# Patient Record
Sex: MALE | Race: WHITE | NOT HISPANIC OR LATINO | URBAN - METROPOLITAN AREA
[De-identification: names, ages, dates, MRNs, and addresses within clinical notes are randomized per-mention and may not be internally consistent; named-entity substitution may affect disease eponyms.]

---

## 2018-05-24 ENCOUNTER — EMERGENCY (EMERGENCY)
Facility: HOSPITAL | Age: 23
LOS: 1 days | Discharge: HOME | End: 2018-05-24
Attending: EMERGENCY MEDICINE | Admitting: EMERGENCY MEDICINE
Payer: COMMERCIAL

## 2018-05-24 VITALS
OXYGEN SATURATION: 98 % | SYSTOLIC BLOOD PRESSURE: 123 MMHG | RESPIRATION RATE: 18 BRPM | HEART RATE: 105 BPM | DIASTOLIC BLOOD PRESSURE: 71 MMHG

## 2018-05-24 PROCEDURE — 99282 EMERGENCY DEPT VISIT SF MDM: CPT

## 2018-05-24 RX ORDER — CHOLECALCIFEROL (VITAMIN D3) 125 MCG
2 CAPSULE ORAL
Qty: 28 | Refills: 0 | OUTPATIENT
Start: 2018-05-24 | End: 2018-06-06

## 2018-05-24 RX ORDER — OMEGA-3 ACID ETHYL ESTERS 1 G
2 CAPSULE ORAL
Qty: 56 | Refills: 0 | OUTPATIENT
Start: 2018-05-24 | End: 2018-06-06

## 2018-05-24 RX ORDER — CLINDAMYCIN PHOSPHATE GEL USP, 1% 10 MG/G
1 GEL TOPICAL
Qty: 1 | Refills: 0 | OUTPATIENT
Start: 2018-05-24 | End: 2018-06-06

## 2018-05-24 RX ORDER — DIVALPROEX SODIUM 500 MG/1
3 TABLET, DELAYED RELEASE ORAL
Qty: 84 | Refills: 0 | OUTPATIENT
Start: 2018-05-24 | End: 2018-06-06

## 2018-05-24 RX ORDER — METHYLPREDNISOLONE 4 MG
1 TABLET ORAL
Qty: 28 | Refills: 0 | OUTPATIENT
Start: 2018-05-24 | End: 2018-06-06

## 2018-05-24 RX ORDER — IBUPROFEN 200 MG
1 TABLET ORAL
Qty: 42 | Refills: 0 | OUTPATIENT
Start: 2018-05-24 | End: 2018-06-06

## 2018-05-24 RX ORDER — BACITRACIN ZINC 500 UNIT/G
1 OINTMENT IN PACKET (EA) TOPICAL
Qty: 1 | Refills: 0 | OUTPATIENT
Start: 2018-05-24 | End: 2018-06-06

## 2018-05-24 RX ORDER — ZINC OXIDE 200 MG/G
1 OINTMENT TOPICAL
Qty: 1 | Refills: 0 | OUTPATIENT
Start: 2018-05-24 | End: 2018-06-06

## 2018-05-24 RX ORDER — ACETAMINOPHEN 500 MG
2 TABLET ORAL
Qty: 168 | Refills: 0 | OUTPATIENT
Start: 2018-05-24 | End: 2018-06-06

## 2018-05-24 RX ORDER — LANOLIN/MINERAL OIL
1 LOTION (ML) TOPICAL
Qty: 1 | Refills: 0 | OUTPATIENT
Start: 2018-05-24 | End: 2018-06-06

## 2018-05-24 RX ORDER — POLYETHYLENE GLYCOL 3350 17 G/17G
17 POWDER, FOR SOLUTION ORAL
Qty: 1 | Refills: 0 | OUTPATIENT
Start: 2018-05-24 | End: 2018-06-06

## 2018-05-24 RX ORDER — QUETIAPINE FUMARATE 200 MG/1
1 TABLET, FILM COATED ORAL
Qty: 42 | Refills: 0 | OUTPATIENT
Start: 2018-05-24 | End: 2018-06-06

## 2018-05-24 RX ORDER — RISPERIDONE 4 MG/1
1 TABLET ORAL
Qty: 28 | Refills: 0 | OUTPATIENT
Start: 2018-05-24 | End: 2018-06-06

## 2018-05-24 NOTE — ED PROVIDER NOTE - SKIN WOUND DESCRIPTION
4.5cm laceration to the dorsum of right arm, healing, no signs of infection, 2 abrasions noted on forehead

## 2018-05-24 NOTE — ED ADULT TRIAGE NOTE - CHIEF COMPLAINT QUOTE
pt coming from group home with aides. aide reports pt is new to group home and needs medication refill.   hx-  cerbral palsy, MR

## 2018-08-19 ENCOUNTER — EMERGENCY (EMERGENCY)
Facility: HOSPITAL | Age: 23
LOS: 1 days | Discharge: HOME | End: 2018-08-19
Attending: EMERGENCY MEDICINE
Payer: COMMERCIAL

## 2018-08-19 VITALS
HEART RATE: 52 BPM | RESPIRATION RATE: 20 BRPM | DIASTOLIC BLOOD PRESSURE: 82 MMHG | SYSTOLIC BLOOD PRESSURE: 116 MMHG | OXYGEN SATURATION: 98 % | TEMPERATURE: 98 F

## 2018-08-19 PROCEDURE — 99283 EMERGENCY DEPT VISIT LOW MDM: CPT

## 2018-08-19 NOTE — ED ADULT NURSE NOTE - NSIMPLEMENTINTERV_GEN_ALL_ED
Implemented All Fall Risk Interventions:  [place] to call system. Call bell, personal items and telephone within reach. Instruct patient to call for assistance. Room bathroom lighting operational. Non-slip footwear when patient is off stretcher. Physically safe environment: no spills, clutter or unnecessary equipment. Stretcher in lowest position, wheels locked, appropriate side rails in place. Provide visual cue, wrist band, yellow gown, etc. Monitor gait and stability. Monitor for mental status changes and reorient to person, place, and time. Review medications for side effects contributing to fall risk. Reinforce activity limits and safety measures with patient and family.

## 2018-08-19 NOTE — ED PROVIDER NOTE - PROGRESS NOTE DETAILS
AG PGY2: reached out to [facility] times. Unable to get care provider on the line. LEft a message for callback. Per aid - patient is at his baseline neurologically and there were no other complaints from the group home. AG PGY2 - patient violently moving in bed - jumped out of bed and now laying on floor - helped /escort patient back to bed and ordered Constant obs. No new head injury.

## 2018-08-19 NOTE — ED PROVIDER NOTE - MEDICAL DECISION MAKING DETAILS
see attending note see attending note  23M hx cp/mr p/w head injury. No active bleed. Patient is at baseline per aid. Will clean wound, update tetatnus status. Patient is well appearing and VSS. Will likely d/c home with f/u.

## 2018-08-19 NOTE — ED PROVIDER NOTE - OBJECTIVE STATEMENT
23M hx cp, MR, from [facility] c/o head injury. Per aid - patient sustained head injury 1 week ago on forehead. Patient is known to bang his head against various things from time to time. Aid reports that his forehead injury started bleeding again which prompted an ED visit. 23M hx cp, MR, from [facility] c/o head injury. Per aid - patient sustained head injury 1 week ago on forehead. Patient is known to bang his head against various things from time to time. Aid reports that his forehead injury started bleeding again which prompted an ED visit.  Aid - wiliam kramer

## 2018-08-19 NOTE — ED ADULT NURSE NOTE - OBJECTIVE STATEMENT
pt has MRCP and lives in a group home.  he is nonverbal and wears a helmet due to head banging.  he banged his head last week causing a cut and today has re-opened that cut.  facility denies he had loc or any n/v.  pt is agitated and combative and arrived with a  from the facility.

## 2018-08-19 NOTE — ED PROVIDER NOTE - CARDIAC, MLM
Normal rate, regular rhythm.  Heart sounds S1, S2.  No murmurs, rubs or gallops. 2+ pulses in distal extremities b/l

## 2018-08-19 NOTE — ED PROVIDER NOTE - NS ED ROS FT
CONST: no fevers, no chills  EYES: no pain  ENT: no sore throat   CV: no chest pain  RESP: no shortness of breath  ABD: no abdominal pain   : no dysuria  MSK: no back pain  NEURO: no headache or additional neurologic complaints  HEME: no easy bleeding  SKIN:  no rash +small forehead wound

## 2018-08-19 NOTE — ED PROVIDER NOTE - ATTENDING CONTRIBUTION TO CARE
------------ATTENDING NOTE------------   pt w/ facility guardian/advocate sent to ED for concerns of rebleeding to forehead laceration, pt has MRDD and often bangs forehead on objects (and always wears a helmet), aide describes pt hitting head 1 wk ago and had laceration that was treated w/ antibiotic ointment and bandage, pt opened up scab and had slight bleeding, wound is healing w/o infectious signs, pt also chronically chews on gums and has superficial cracks w/o infectious changes, per aide vaccinations utd, no need for intervention now, no additional signs of injury, d/w facility and aide, nml VS at [place], in depth dw all about ddx, tx, [place], continued close outpt fu.  - Oswaldo Pettit MD   ------------------------------------------------

## 2018-08-19 NOTE — ED PROVIDER NOTE - CONSTITUTIONAL, MLM
normal... Patient is nonverbal, trying to escape from bed. wearing helmat, Otherwise Well appearing, well nourished, awake, alert, Not answering questions or following commands - baseline per aid

## 2018-08-19 NOTE — ED PROVIDER NOTE - SKIN, MLM
Skin normal color for race, warm, dry and intact. No evidence of rash. Small 4cm horizontal laceration on forhead - very superficial, no active bleeding with small amount of dried blood on nose and b/l cheeks. No other evidence of wounds on head/neck/mouth

## 2018-08-20 PROBLEM — F39 UNSPECIFIED MOOD [AFFECTIVE] DISORDER: Chronic | Status: ACTIVE | Noted: 2018-05-24

## 2018-08-20 PROBLEM — G80.9 CEREBRAL PALSY, UNSPECIFIED: Chronic | Status: ACTIVE | Noted: 2018-05-24

## 2018-08-20 PROBLEM — F79 UNSPECIFIED INTELLECTUAL DISABILITIES: Chronic | Status: ACTIVE | Noted: 2018-05-24

## 2018-10-08 ENCOUNTER — EMERGENCY (EMERGENCY)
Facility: HOSPITAL | Age: 23
LOS: 1 days | Discharge: HOME | End: 2018-10-08
Attending: EMERGENCY MEDICINE | Admitting: EMERGENCY MEDICINE
Payer: COMMERCIAL

## 2018-10-08 VITALS — RESPIRATION RATE: 18 BRPM | HEART RATE: 82 BPM | OXYGEN SATURATION: 98 %

## 2018-10-08 PROCEDURE — 99283 EMERGENCY DEPT VISIT LOW MDM: CPT

## 2018-10-08 RX ORDER — TETANUS TOXOID, REDUCED DIPHTHERIA TOXOID AND ACELLULAR PERTUSSIS VACCINE, ADSORBED 5; 2.5; 8; 8; 2.5 [IU]/.5ML; [IU]/.5ML; UG/.5ML; UG/.5ML; UG/.5ML
0.5 SUSPENSION INTRAMUSCULAR ONCE
Qty: 0 | Refills: 0 | Status: COMPLETED | OUTPATIENT
Start: 2018-10-08 | End: 2018-10-08

## 2018-10-08 NOTE — ED PROVIDER NOTE - MEDICAL DECISION MAKING DETAILS
23y m w known CP and MR, frequent head banging, p/w head injx. Small nonbleeding laceration on posterior scalp present. Pt extremely combative and uncooperative at baseline. Per aides he is at his normal mental status, no LOC or vomiting. In setting of poor cooperation and hx violence, and due to no red flags and no bleeding, will not pursue further w/u or attempt suturing/stapling, as this would agitate pt and likely require sedation, likely inflicing more harm than good on pt overall. Will admin tetanus shot and dc w/ instrx to put bacitracin on wound and clear well -[place]

## 2018-10-08 NOTE — ED ADULT NURSE NOTE - OBJECTIVE STATEMENT
(facilitator RN ) Pt evaluated by MD Rocha.  Abrasion noted to the back of the head with no bleeding.  No behavioral change noted as per the staff from group home. Pt is awake, not cooperative.  Pt unable to stay still, unable to measure bp reading.  MD made aware.

## 2018-10-08 NOTE — ED ADULT NURSE NOTE - NSIMPLEMENTINTERV_GEN_ALL_ED
Implemented All Universal Safety Interventions:  [place] to call system. Call bell, personal items and telephone within reach. Instruct patient to call for assistance. Room bathroom lighting operational. Non-slip footwear when patient is off stretcher. Physically safe environment: no spills, clutter or unnecessary equipment. Stretcher in lowest position, wheels locked, appropriate side rails in place.

## 2018-10-08 NOTE — ED ADULT TRIAGE NOTE - CHIEF COMPLAINT QUOTE
p/t with severe MR sent from [facility] javon robbins, p/t was banging his head against the wall this am ,p/t wearing a  helmet @ all times no neuro deficits noted, p/t is awake and playful, unable to get a b/p reading p/t unable to stay still

## 2018-10-08 NOTE — ED PROVIDER NOTE - OBJECTIVE STATEMENT
23y m w/ PMHx CP, severe MR, resident at [facility] and hx repetitive head banging (wear's a soft helmet at all times), p/w head injury. Two aides from [facility] in room w/ patient; they state some blood was noted dripping down around edge of helmet this AM, and he was brought here for evaluation. It appears 23y m w/ PMHx CP, severe MR, resident at [facility] and hx repetitive head banging (wear's a soft helmet at all times), p/w head injury. Two aides from [facility] in room w/ patient; they state some blood was noted dripping down around edge of helmet this AM, and he was brought here for evaluation. It appears he injured himself through the soft helmet; the pt has a small laceration on the back of his scalp, not currently actively bleeding but with dried blood around the injury site. He is extremely combative on exam, per the aides this is his baseline. No witnessed nausea or vomiting, no LOC.

## 2018-10-08 NOTE — ED ADULT NURSE NOTE - CHIEF COMPLAINT QUOTE
p/t with severe MR sent from [facility] javon robbins, p/t was banging his head against the wall this am ,p/t wearing a  helmet @ all times no neuro deficits noted, p/t is awake and playful, unable to get a b/p reading p/t unable to stay still

## 2018-10-08 NOTE — ED PROVIDER NOTE - ATTENDING CONTRIBUTION TO CARE
Seen and examined, pt. uncooperative with exam and unable to give hx, per staff, witnessed head banging and then saw blood below helmet. Pt. has long hx of similar behavior and brought today to get checked. No change in behavior per staff who know pt. well, no N/V, ambulating normally. Examined in ED with full ROM neck, NT spine, clear lungs, heart reg, abd soft, flat, NT to palp, no bony ext. tenderness. Pt. at baseline has episodes of agitation and will not obey commands in ED but accepts exam.

## 2018-10-10 ENCOUNTER — INPATIENT (INPATIENT)
Facility: HOSPITAL | Age: 23
LOS: 5 days | Discharge: NURSING, REHAB OR LONG-TERM CARE | End: 2018-10-16
Attending: HOSPITALIST | Admitting: HOSPITALIST
Payer: COMMERCIAL

## 2018-10-10 VITALS
DIASTOLIC BLOOD PRESSURE: 98 MMHG | TEMPERATURE: 98 F | WEIGHT: 109.57 LBS | RESPIRATION RATE: 22 BRPM | SYSTOLIC BLOOD PRESSURE: 140 MMHG

## 2018-10-10 DIAGNOSIS — F39 UNSPECIFIED MOOD [AFFECTIVE] DISORDER: ICD-10-CM

## 2018-10-10 DIAGNOSIS — T18.9XXA FOREIGN BODY OF ALIMENTARY TRACT, PART UNSPECIFIED, INITIAL ENCOUNTER: ICD-10-CM

## 2018-10-10 LAB
ALBUMIN SERPL ELPH-MCNC: 4.2 G/DL — SIGNIFICANT CHANGE UP (ref 3.3–5)
ALP SERPL-CCNC: 65 U/L — SIGNIFICANT CHANGE UP (ref 40–120)
ALT FLD-CCNC: 18 U/L — SIGNIFICANT CHANGE UP (ref 4–41)
APTT BLD: 33.9 SEC — SIGNIFICANT CHANGE UP (ref 27.5–37.4)
AST SERPL-CCNC: 30 U/L — SIGNIFICANT CHANGE UP (ref 4–40)
BASOPHILS # BLD AUTO: 0.01 K/UL — SIGNIFICANT CHANGE UP (ref 0–0.2)
BASOPHILS NFR BLD AUTO: 0.1 % — SIGNIFICANT CHANGE UP (ref 0–2)
BILIRUB SERPL-MCNC: 0.3 MG/DL — SIGNIFICANT CHANGE UP (ref 0.2–1.2)
BLD GP AB SCN SERPL QL: NEGATIVE — SIGNIFICANT CHANGE UP
BUN SERPL-MCNC: 6 MG/DL — LOW (ref 7–23)
CALCIUM SERPL-MCNC: 9.6 MG/DL — SIGNIFICANT CHANGE UP (ref 8.4–10.5)
CHLORIDE SERPL-SCNC: 97 MMOL/L — LOW (ref 98–107)
CO2 SERPL-SCNC: 27 MMOL/L — SIGNIFICANT CHANGE UP (ref 22–31)
CREAT SERPL-MCNC: 0.66 MG/DL — SIGNIFICANT CHANGE UP (ref 0.5–1.3)
EOSINOPHIL # BLD AUTO: 0.07 K/UL — SIGNIFICANT CHANGE UP (ref 0–0.5)
EOSINOPHIL NFR BLD AUTO: 1 % — SIGNIFICANT CHANGE UP (ref 0–6)
GLUCOSE SERPL-MCNC: 89 MG/DL — SIGNIFICANT CHANGE UP (ref 70–99)
HCT VFR BLD CALC: 41.4 % — SIGNIFICANT CHANGE UP (ref 39–50)
HGB BLD-MCNC: 13.6 G/DL — SIGNIFICANT CHANGE UP (ref 13–17)
IMM GRANULOCYTES # BLD AUTO: 0.07 # — SIGNIFICANT CHANGE UP
IMM GRANULOCYTES NFR BLD AUTO: 1 % — SIGNIFICANT CHANGE UP (ref 0–1.5)
INR BLD: 1.04 — SIGNIFICANT CHANGE UP (ref 0.88–1.17)
LYMPHOCYTES # BLD AUTO: 0.9 K/UL — LOW (ref 1–3.3)
LYMPHOCYTES # BLD AUTO: 12.6 % — LOW (ref 13–44)
MCHC RBC-ENTMCNC: 30 PG — SIGNIFICANT CHANGE UP (ref 27–34)
MCHC RBC-ENTMCNC: 32.9 % — SIGNIFICANT CHANGE UP (ref 32–36)
MCV RBC AUTO: 91.2 FL — SIGNIFICANT CHANGE UP (ref 80–100)
MONOCYTES # BLD AUTO: 0.89 K/UL — SIGNIFICANT CHANGE UP (ref 0–0.9)
MONOCYTES NFR BLD AUTO: 12.5 % — SIGNIFICANT CHANGE UP (ref 2–14)
NEUTROPHILS # BLD AUTO: 5.18 K/UL — SIGNIFICANT CHANGE UP (ref 1.8–7.4)
NEUTROPHILS NFR BLD AUTO: 72.8 % — SIGNIFICANT CHANGE UP (ref 43–77)
NRBC # FLD: 0 — SIGNIFICANT CHANGE UP
PLATELET # BLD AUTO: 136 K/UL — LOW (ref 150–400)
PMV BLD: 10.9 FL — SIGNIFICANT CHANGE UP (ref 7–13)
POTASSIUM SERPL-MCNC: 4.3 MMOL/L — SIGNIFICANT CHANGE UP (ref 3.5–5.3)
POTASSIUM SERPL-SCNC: 4.3 MMOL/L — SIGNIFICANT CHANGE UP (ref 3.5–5.3)
PROT SERPL-MCNC: 7.7 G/DL — SIGNIFICANT CHANGE UP (ref 6–8.3)
PROTHROM AB SERPL-ACNC: 11.6 SEC — SIGNIFICANT CHANGE UP (ref 9.8–13.1)
RBC # BLD: 4.54 M/UL — SIGNIFICANT CHANGE UP (ref 4.2–5.8)
RBC # FLD: 13.5 % — SIGNIFICANT CHANGE UP (ref 10.3–14.5)
RH IG SCN BLD-IMP: POSITIVE — SIGNIFICANT CHANGE UP
SODIUM SERPL-SCNC: 139 MMOL/L — SIGNIFICANT CHANGE UP (ref 135–145)
WBC # BLD: 7.12 K/UL — SIGNIFICANT CHANGE UP (ref 3.8–10.5)
WBC # FLD AUTO: 7.12 K/UL — SIGNIFICANT CHANGE UP (ref 3.8–10.5)

## 2018-10-10 PROCEDURE — 99223 1ST HOSP IP/OBS HIGH 75: CPT

## 2018-10-10 PROCEDURE — 99222 1ST HOSP IP/OBS MODERATE 55: CPT | Mod: GC

## 2018-10-10 PROCEDURE — 70490 CT SOFT TISSUE NECK W/O DYE: CPT | Mod: 26

## 2018-10-10 PROCEDURE — 74176 CT ABD & PELVIS W/O CONTRAST: CPT | Mod: 26

## 2018-10-10 RX ORDER — CHOLECALCIFEROL (VITAMIN D3) 125 MCG
2000 CAPSULE ORAL DAILY
Qty: 0 | Refills: 0 | Status: DISCONTINUED | OUTPATIENT
Start: 2018-10-10 | End: 2018-10-16

## 2018-10-10 RX ORDER — RISPERIDONE 4 MG/1
1 TABLET ORAL
Qty: 0 | Refills: 0 | COMMUNITY

## 2018-10-10 RX ORDER — LITHIUM CARBONATE 300 MG/1
450 TABLET, EXTENDED RELEASE ORAL
Qty: 0 | Refills: 0 | COMMUNITY

## 2018-10-10 RX ORDER — DIPHENHYDRAMINE HCL 50 MG
50 CAPSULE ORAL ONCE
Qty: 0 | Refills: 0 | Status: COMPLETED | OUTPATIENT
Start: 2018-10-10 | End: 2018-10-10

## 2018-10-10 RX ORDER — QUETIAPINE FUMARATE 200 MG/1
400 TABLET, FILM COATED ORAL
Qty: 0 | Refills: 0 | Status: DISCONTINUED | OUTPATIENT
Start: 2018-10-10 | End: 2018-10-16

## 2018-10-10 RX ORDER — LITHIUM CARBONATE 300 MG/1
450 TABLET, EXTENDED RELEASE ORAL AT BEDTIME
Qty: 0 | Refills: 0 | Status: DISCONTINUED | OUTPATIENT
Start: 2018-10-10 | End: 2018-10-10

## 2018-10-10 RX ORDER — HALOPERIDOL DECANOATE 100 MG/ML
5 INJECTION INTRAMUSCULAR ONCE
Qty: 0 | Refills: 0 | Status: COMPLETED | OUTPATIENT
Start: 2018-10-10 | End: 2018-10-10

## 2018-10-10 RX ORDER — DIVALPROEX SODIUM 500 MG/1
1 TABLET, DELAYED RELEASE ORAL
Qty: 0 | Refills: 0 | COMMUNITY

## 2018-10-10 RX ORDER — SODIUM CHLORIDE 9 MG/ML
1000 INJECTION INTRAMUSCULAR; INTRAVENOUS; SUBCUTANEOUS ONCE
Qty: 0 | Refills: 0 | Status: COMPLETED | OUTPATIENT
Start: 2018-10-10 | End: 2018-10-10

## 2018-10-10 RX ORDER — RISPERIDONE 4 MG/1
3 TABLET ORAL AT BEDTIME
Qty: 0 | Refills: 0 | Status: DISCONTINUED | OUTPATIENT
Start: 2018-10-10 | End: 2018-10-16

## 2018-10-10 RX ORDER — INFLUENZA VIRUS VACCINE 15; 15; 15; 15 UG/.5ML; UG/.5ML; UG/.5ML; UG/.5ML
0.5 SUSPENSION INTRAMUSCULAR ONCE
Qty: 0 | Refills: 0 | Status: DISCONTINUED | OUTPATIENT
Start: 2018-10-10 | End: 2018-10-16

## 2018-10-10 RX ORDER — LITHIUM CARBONATE 300 MG/1
450 TABLET, EXTENDED RELEASE ORAL AT BEDTIME
Qty: 0 | Refills: 0 | Status: DISCONTINUED | OUTPATIENT
Start: 2018-10-10 | End: 2018-10-16

## 2018-10-10 RX ORDER — QUETIAPINE FUMARATE 200 MG/1
1 TABLET, FILM COATED ORAL
Qty: 0 | Refills: 0 | COMMUNITY

## 2018-10-10 RX ADMIN — HALOPERIDOL DECANOATE 5 MILLIGRAM(S): 100 INJECTION INTRAMUSCULAR at 15:49

## 2018-10-10 RX ADMIN — SODIUM CHLORIDE 1000 MILLILITER(S): 9 INJECTION INTRAMUSCULAR; INTRAVENOUS; SUBCUTANEOUS at 14:30

## 2018-10-10 RX ADMIN — Medication 2 MILLIGRAM(S): at 15:49

## 2018-10-10 RX ADMIN — LITHIUM CARBONATE 450 MILLIGRAM(S): 300 TABLET, EXTENDED RELEASE ORAL at 21:57

## 2018-10-10 RX ADMIN — Medication 100 MILLIGRAM(S): at 22:59

## 2018-10-10 RX ADMIN — Medication 50 MILLIGRAM(S): at 12:40

## 2018-10-10 RX ADMIN — RISPERIDONE 3 MILLIGRAM(S): 4 TABLET ORAL at 21:58

## 2018-10-10 RX ADMIN — QUETIAPINE FUMARATE 400 MILLIGRAM(S): 200 TABLET, FILM COATED ORAL at 21:58

## 2018-10-10 RX ADMIN — HALOPERIDOL DECANOATE 5 MILLIGRAM(S): 100 INJECTION INTRAMUSCULAR at 13:40

## 2018-10-10 RX ADMIN — Medication 2 MILLIGRAM(S): at 11:26

## 2018-10-10 NOTE — H&P ADULT - PROBLEM SELECTOR PLAN 1
case d/w GI.  Discussed CT findings.  As fragments appear to be past pylorus, unlikely that endoscopy can retrieve them.    - will re-evaluate in am  - diet resumed, and NPO after midnight

## 2018-10-10 NOTE — CONSULT NOTE ADULT - ASSESSMENT
Impression:  # Glass ingestion: No evidence of peritonitis on exam. Unclear if patient swallowed a significant amount of glass given aides were able to remove it. He also ate this morning which would severely limit endoscopic visualization at this time.     Recommendation:  - Obtain CT A/P to assess for foreign bodies  - Will discuss possible endoscopic intervention pending CT results  - Supportive care per primary team    Nila Schwartz MD  Gastroenterology Fellow  [phone]  Please page on call fellow on weekends and after 5pm on weekdays

## 2018-10-10 NOTE — H&P ADULT - NSHPLABSRESULTS_GEN_ALL_CORE
10-10    139  |  97<L>  |  6<L>  ----------------------------<  89  4.3   |  27  |  0.66    Ca    9.6      10 Oct 2018 10:53    TPro  7.7  /  Alb  4.2  /  TBili  0.3  /  DBili  x   /  AST  30  /  ALT  18  /  AlkPhos  65  10-10                            13.6   7.12  )-----------( 136      ( 10 Oct 2018 10:53 )             41.4             PT/INR - ( 10 Oct 2018 10:53 )   PT: 11.6 SEC;   INR: 1.04          PTT - ( 10 Oct 2018 10:53 )  PTT:33.9 SEC    < from: CT Abdomen and Pelvis No Cont (10.10.18 @ 16:45) >    High density linear objects in the proximal duodenum may represent   fragments of glass. Additional punctate densities in the jejunum,   possibly additional fragments.    < end of copied text >

## 2018-10-10 NOTE — ED PEDIATRIC TRIAGE NOTE - CHIEF COMPLAINT QUOTE
Patient took picture frame with glass and started biting/eating it, here to r/o glass in throat. Patient noted to have small laceration to right index finger. As per aide from group home, "swept glass out with a minimal amount of blood." Patient at baseline mental status, drooling normally no blood noted. Patient with clear LS bilaterally.

## 2018-10-10 NOTE — ED ADULT NURSE NOTE - OBJECTIVE STATEMENT
Pt rec'd to ED R#15 from [facility], accompanied by 2 mental health workers, after pt found this AM chewing on glass from broken picture frame this AM. Staff state he seems to be coughing more than at baseline. Pt is in NAD, respirations even and unlabored, no coughing noted upon arrival to ED. Aides deny any blood coming from mouth/ lips/ teeth. Deny any vomiting. Pt drooling as per baseline, pt is acting normally as per aids.

## 2018-10-10 NOTE — ED PROVIDER NOTE - ATTENDING CONTRIBUTION TO CARE
22 yo male MR, with aides stating he ate glass frame, had to take pieces of glass out of his mouth this morning, sent to ED to r/o swallowed glass. Patient comfortable. Able to clearly visualize oropharynx, which had no lacerations. discussed with GI, want to admit patient for endoscopy under anesthesia, CT of chest and abdomen.

## 2018-10-10 NOTE — ED PROVIDER NOTE - ENMT, MLM
Airway patent, Nasal mucosa clear. Mouth with normal mucosa. Throat has no vesicles, no oropharyngeal exudates and uvula is midline. Oropharynx with no lacerations or bleeding. Small abrasion noted to crease of lips on right.

## 2018-10-10 NOTE — H&P ADULT - HISTORY OF PRESENT ILLNESS
22 y/o M with cerebral palsy, severe MR (nonverbal,) and mood disorder with self harming behavior presents to the ED after swallowing glass.  [facility] from [facility] report that pt was eating this am when he took a glass display plaque from the wall, and bit off a piece.  Staff members were able to remove most of the glass fragments from his mouth, but they think he swallowed some.  Pt is not able to communicate pain or discomfort, but has been at baseline with typical intermittent agitation per  staff.     In the ED, pt was evaluated by gastroenterology.

## 2018-10-10 NOTE — ED ADULT TRIAGE NOTE - CHIEF COMPLAINT QUOTE
Patient was sent in by the [facility] for eating glass this morning,  Pt is non verbal hx of MR pt has two mental health workers at this side.  No respiratory distress noted no bleeding noted in the mouth.

## 2018-10-10 NOTE — ED PROVIDER NOTE - MEDICAL DECISION MAKING DETAILS
22 yo male with pmhx of MR, presents to ED for chewing on glass since morning x 7:30am. will check CT, ENT and GI consult

## 2018-10-10 NOTE — ED PROVIDER NOTE - PROGRESS NOTE DETAILS
KIRA Hair- pt noted to become agitated with nurse aids, given 2 of ativan. KIRA Hair- spoke with ENT PA, suggesting to CT neck. if ct negative, no necessary scope or intervention needed. spoke with GI, suggesting pre-ops labs, plan for endoscopy. will CT abdomen/pelvis as well given pt had PO food intake. KIRA Hair- pt required multiple rounds of medication for pt to be cooperative for CT. tolerated well. spoke with hospitalist, will admit to medicine, GI following, will f/u CT neck results. MAR paged

## 2018-10-10 NOTE — PATIENT PROFILE ADULT - NSASFALLNEEDSASSIST_GEN_A_NUR
[] : no respiratory distress [Apical Impulse] : the apical impulse was normal [Clean] : clean [Healing Well] : healing well [No Edema] : no edema yes

## 2018-10-10 NOTE — ED PROVIDER NOTE - OBJECTIVE STATEMENT
hx obtained from two nurse aids from group home:    22 yo male with pmhx of MR, presents to ED for chewing on glass since morning x 7:30am. Nurse aid witnessed pt with photo frame glass in his hands and chewing on it. Nurse aide removed small pieces from his mouth and heard him chewing on smaller pieces. Pt may have swallowed glass, aids are unsure. Pt with hx of bagging head on walls and wears soft helmet. Hx of agitation and physical violent behavior. Noted with small cut on right index finger. Tetanus updated 2 days ago in ED for hitting his head, laceration was not repaired. No fever, hemoptysis, vomiting. Ate food this morning.

## 2018-10-10 NOTE — ED ADULT NURSE NOTE - CHIEF COMPLAINT QUOTE
Patient was sent in by the [employer] for eating glass this morning,  Pt is non verbal hx of MR pt has two mental health workers at this side.  No respiratory distress noted no bleeding noted in the mouth.

## 2018-10-10 NOTE — CONSULT NOTE ADULT - SUBJECTIVE AND OBJECTIVE BOX
Chief Complaint:  Patient is a 23y old  Male who presents with a chief complaint of glass ingestion    HPI:  23 year old man with hx of Mental Retardation (nonverbal at baseline, resides in group home) presents after eating glass from a picture. Per aides at bedside, patient was in his usual state of health this morning when they saw him chewing on glass from a picture. They were able to remove the majority of the glass from his mouth, but afterwards continued to hear 'crunching.' Due to concerns that he may have swallowed the glass, they brought him the ED. Patient is unable to verbalize complaints, but he has been behaving normally since the incident. No recent reports of fevers, nausea, vomiting, diarrhea, melena, hematochezia and cough.  Aides report patient ate oatmeal this morning prior to chewing the glass    Allergies:  No Known Allergies    Home Medications:  None    Hospital Medications:    PMHX/PSHX:  Mood disorder  MR (mental retardation)  Cerebral palsy    Family history:    Unable to Assess     Social History:   Unable to Assess     ROS:   Unable to Assess     PHYSICAL EXAM:   GENERAL:  NAD, Appears stated age  HEENT:  NC/AT,  conjunctivae clear and pink, sclera -anicteric  CHEST:  CTA B/L, Normal effort  HEART:  RRR S1/S2, No murmurs  ABDOMEN:  Soft, non-tender, non-distended, normoactive bowel sounds  EXTEREMITIES:  No cyanosis or Edema  SKIN:  Warm & Dry. No rash or erythema  NEURO: AOx0, moving all extremities    Vital Signs:  Vital Signs Last 24 Hrs  T(C): 36.9 (10 Oct 2018 09:11), Max: 36.9 (10 Oct 2018 09:11)  T(F): 98.4 (10 Oct 2018 09:11), Max: 98.4 (10 Oct 2018 09:11)  HR: 110 (10 Oct 2018 11:33) (110 - 110)  BP: 140/89 (10 Oct 2018 11:33) (131/86 - 140/98)  BP(mean): --  RR: 16 (10 Oct 2018 11:33) (16 - 22)  SpO2: 100% (10 Oct 2018 11:33) (100% - 100%)  Daily     Daily     LABS:                        13.6   7.12  )-----------( 136      ( 10 Oct 2018 10:53 )             41.4     Mean Cell Volume: 91.2 fL (10-10-18 @ 10:53)

## 2018-10-10 NOTE — ED ADULT NURSE NOTE - NSIMPLEMENTINTERV_GEN_ALL_ED
Implemented All Fall Risk Interventions:  [place] to call system. Call bell, personal items and telephone within reach. Instruct patient to call for assistance. Room bathroom lighting operational. Non-slip footwear when patient is off stretcher. Physically safe environment: no spills, clutter or unnecessary equipment. Stretcher in lowest position, wheels locked, appropriate side rails in place. Provide visual cue, wrist band, yellow gown, etc. Monitor gait and stability. Monitor for mental status changes and reorient to person, place, and time. Review medications for side effects contributing to fall risk. Reinforce activity limits and safety measures with patient and family.

## 2018-10-10 NOTE — H&P ADULT - NSHPPHYSICALEXAM_GEN_ALL_CORE
Vital Signs Last 24 Hrs  T(C): 37 (10 Oct 2018 15:23), Max: 37.6 (10 Oct 2018 13:40)  T(F): 98.6 (10 Oct 2018 15:23), Max: 99.7 (10 Oct 2018 13:40)  HR: 98 (10 Oct 2018 15:23) (98 - 117)  BP: 141/92 (10 Oct 2018 15:23) (131/86 - 141/92)  BP(mean): --  RR: 16 (10 Oct 2018 15:23) (16 - 22)  SpO2: 100% (10 Oct 2018 15:23) (100% - 100%)    PHYSICAL EXAM:  GENERAL: No Acute Distress  EYES: conjunctiva and sclera clear  ENMT: Moist mucous membranes   NECK: Supple  CHEST/LUNG: Clear to auscultation bilaterally  HEART: Tachy, regular rhythm; No murmurs, rubs, or gallops  ABDOMEN: Soft, Nontender, Nondistended; Bowel sounds normal  EXTREMITIES:   No clubbing, cyanosis, or pedal edema  PSYCH: Agitated, kicking bed rails  NEUROLOGY:   - Mental status alert, nonverbal, noncommunicative   SKIN: No rashes or lesions  MUSCULOSKELETAL: No joint swelling

## 2018-10-11 DIAGNOSIS — F89 UNSPECIFIED DISORDER OF PSYCHOLOGICAL DEVELOPMENT: ICD-10-CM

## 2018-10-11 PROCEDURE — 99232 SBSQ HOSP IP/OBS MODERATE 35: CPT | Mod: GC

## 2018-10-11 PROCEDURE — 90792 PSYCH DIAG EVAL W/MED SRVCS: CPT

## 2018-10-11 PROCEDURE — 99233 SBSQ HOSP IP/OBS HIGH 50: CPT

## 2018-10-11 PROCEDURE — 93010 ELECTROCARDIOGRAM REPORT: CPT

## 2018-10-11 RX ADMIN — Medication 2 MILLIGRAM(S): at 17:58

## 2018-10-11 RX ADMIN — RISPERIDONE 3 MILLIGRAM(S): 4 TABLET ORAL at 21:34

## 2018-10-11 RX ADMIN — Medication 2000 UNIT(S): at 17:43

## 2018-10-11 RX ADMIN — QUETIAPINE FUMARATE 400 MILLIGRAM(S): 200 TABLET, FILM COATED ORAL at 17:43

## 2018-10-11 RX ADMIN — LITHIUM CARBONATE 450 MILLIGRAM(S): 300 TABLET, EXTENDED RELEASE ORAL at 21:34

## 2018-10-11 RX ADMIN — Medication 100 MILLIGRAM(S): at 21:34

## 2018-10-11 NOTE — BEHAVIORAL HEALTH ASSESSMENT NOTE - SUMMARY
22 y/o M with cerebral palsy, severe MR (nonverbal,) and mood disorder with self harming behavior presents to the ED after swallowing glass. Patient has had intermittent frequent episodes of aggressive and unpredictable behavior, often involving self-injury, such banging his head against the wall. He also has frequent bursts of agitation with violent behavior, including biting people. Patient was recently started on lithium which reportedly has been helpful for his aggression and impulsivity.     Recommendations:   - Continue lithium CR 450mg po qhs   - Continue risperidone 3mg po daily   - Continue Seroquel 400mg po BID   - Haldol 5mg po PRN q6h for agitation   - Haldol 5mg/Ativan 2mg IVP PRN q6h for severe agitation 22 y/o M with cerebral palsy, severe MR (nonverbal,) and mood disorder with self harming behavior presents to the ED after swallowing glass. Patient has had intermittent frequent episodes of aggressive and unpredictable behavior, often involving self-injury, such banging his head against the wall. He also has frequent bursts of agitation with violent behavior, including biting people. Patient was recently started on lithium which reportedly has been helpful for his aggression and impulsivity.     Recommendations:   - Continue lithium CR 450mg po qhs   - Continue risperidone 3mg po daily   - Continue Seroquel 400mg po BID   - Ativan 2mg IVP PRN q6h for severe agitation

## 2018-10-11 NOTE — CONSULT NOTE ADULT - ASSESSMENT
ASSESSMENT:  23M w/ CP, severe MR, mood disorder with baseline self-harming behavior admitted for swallowing glass. Surgery is consulted for management of glass fragment ingestion.     -     Patient discussed with Dr. Bowser PGY-2  Surgery pager [phone] ASSESSMENT:  23M w/ CP, severe MR, mood disorder with baseline self-harming behavior admitted for swallowing glass. Surgery is consulted for management of glass fragment ingestion. At this time, patient is stable without signs of     [facility]  Surgery pager [phone] ASSESSMENT:  23M w/ CP, severe MR, mood disorder with baseline self-harming behavior admitted for swallowing glass. Surgery is consulted for management of glass fragment ingestion. At this time, patient is stable without signs of perforation and is hemodynamically stable. The glass shards have continued to progress through the bowel and will require continued abdominal exams and monitoring. Patient does not require surgical intervention to remove the shards unless patient becomes acutely peritonitic, febrile, tachycardic, hypotensive, or shows other overt signs of bleeding/perforation.     Patient discussed with Dr. Selby [facility] PGY-2  Surgery pager [phone]

## 2018-10-11 NOTE — PROGRESS NOTE ADULT - PROBLEM SELECTOR PLAN 1
case d/w GI  on CT fragments appear to be past pylorus, so unable to retrieve, will as gen surg input, PO as tolerates for now  - monitor closely for abdominal pain, fever, inc WBC

## 2018-10-11 NOTE — BEHAVIORAL HEALTH ASSESSMENT NOTE - CASE SUMMARY
Pt is a 24 y/o M with cerebral palsy, severe MR (nonverbal,) and mood disorder with self harming behavior presents to the ED after swallowing glass. Patient has had intermittent frequent episodes of aggressive and unpredictable behavior, often involving self-injury, such banging his head against the wall. He also has frequent bursts of agitation with violent behavior, including biting people. Patient was recently started on lithium which reportedly has been helpful for his aggression and impulsivity. Pt did respond well to an Ativan prn and may continue Ativan as needed.

## 2018-10-11 NOTE — CONSULT NOTE ADULT - SUBJECTIVE AND OBJECTIVE BOX
Surger Consultation Note  =====================================================  HPI:   23M w/ CP, severe MR, mood disorder with baseline self-harming behavior admitted for swallowing glass. Patient noncommunicative and unable to provide history, but per HPI patient attempted to eat glass display case. On CT scan, patient noted to have possible glass fragments in the duodenum and jejunum. Per staff members at bedside, patient is at baseline agitation and they do not believe that he is showing signs that he is in pain. Unable to assess ROS. Surgery is consulted for management of glass fragment ingestion.     HPI:  24 y/o M with cerebral palsy, severe MR (nonverbal,) and mood disorder with self harming behavior presents to the ED after swallowing glass.  Staff from [facility] report that pt was eating this am when he took a glass display plaque from the wall, and bit off a piece.  Staff members were able to remove most of the glass fragments from his mouth, but they think he swallowed some.  Pt is not able to communicate pain or discomfort, but has been at baseline with typical intermittent agitation per  staff.     In the ED, pt was evaluated by gastroenterology. (10 Oct 2018 19:21)    Allergies:   PAST MEDICAL & SURGICAL HISTORY:  Mood disorder  MR (mental retardation)  Cerebral palsy  No significant past surgical history    FAMILY HISTORY:  Family history unknown    SOCIAL HISTORY:     ADVANCE DIRECTIVES: Presumed Full Code      REVIEW OF SYSTEMS:    General: Non-Contributory  Skin/Breast: Non-Contributory  Ophthalmologic: Non-Contributory  ENMT: Non-Contributory  Respiratory and Thorax: Non-Contributory  Cardiovascular: Non-Contributory  Gastrointestinal: Non-Contributory  Genitourinary: Non-Contributory  Musculoskeletal: Non-Contributory  Neurological: Non-Contributory  Psychiatric: Non-Contributory  Hematology/Lymphatics: Non-Contributory  Endocrine: Non-Contributory  Allergic/Immunologic: Non-Contributory    HOME MEDICATIONS:    Home Medications:  Depakote  mg oral tablet, extended release: 1 tab(s) orally 3 times a day (10 Oct 2018 18:57)  lithium carbonate extended release: 450 milligram(s) orally once a day (at bedtime) (10 Oct 2018 18:57)  QUEtiapine 400 mg oral tablet: 1 tab(s) orally 2 times a day (10 Oct 2018 18:57)  risperiDONE 3 mg oral tablet: 1 tab(s) orally once a day (at bedtime) (10 Oct 2018 18:57)    CURRENT MEDICATIONS:   --------------------------------------------------------------------------------------  Neurologic Medications  lithium CR (ESKALITH-CR) 450 milliGRAM(s) Oral at bedtime  LORazepam     Tablet 2 milliGRAM(s) Oral every 6 hours PRN Agitation  LORazepam   Injectable 2 milliGRAM(s) IV Push every 6 hours PRN Severe Agitation  QUEtiapine 400 milliGRAM(s) Oral two times a day  risperiDONE   Tablet 3 milliGRAM(s) Oral at bedtime    Respiratory Medications    Cardiovascular Medications    Gastrointestinal Medications  cholecalciferol 2000 Unit(s) Oral daily    Genitourinary Medications    Hematologic/Oncologic Medications  influenza   Vaccine 0.5 milliLiter(s) IntraMuscular once    Antimicrobial/Immunologic Medications    Endocrine/Metabolic Medications    Topical/Other Medications    --------------------------------------------------------------------------------------    VITAL SIGNS, INS/OUTS (last 24 hours):  --------------------------------------------------------------------------------------  Vital Signs Last 24 Hrs  T(C): 36.2 (11 Oct 2018 21:17), Max: 36.4 (11 Oct 2018 05:41)  T(F): 97.2 (11 Oct 2018 21:17), Max: 97.6 (11 Oct 2018 13:48)  HR: 100 (11 Oct 2018 21:17) (89 - 109)  BP: 120/60 (11 Oct 2018 21:17) (110/72 - 127/84)  BP(mean): --  RR: 18 (11 Oct 2018 21:17) (18 - 18)  SpO2: 99% (11 Oct 2018 21:17) (98% - 99%)  --------------------------------------------------------------------------------------    EXAM  Vital Signs Last 24 Hrs  T(C): 36.2 (11 Oct 2018 21:17), Max: 36.4 (11 Oct 2018 05:41)  T(F): 97.2 (11 Oct 2018 21:17), Max: 97.6 (11 Oct 2018 13:48)  HR: 100 (11 Oct 2018 21:17) (89 - 109)  BP: 120/60 (11 Oct 2018 21:17) (110/72 - 127/84)  BP(mean): --  RR: 18 (11 Oct 2018 21:17) (18 - 18)  SpO2: 99% (11 Oct 2018 21:17) (98% - 99%)    LABS  --------------------------------------------------------------------------------------  Vital Signs Last 24 Hrs  T(C): 36.2 (11 Oct 2018 21:17), Max: 36.4 (11 Oct 2018 05:41)  T(F): 97.2 (11 Oct 2018 21:17), Max: 97.6 (11 Oct 2018 13:48)  HR: 100 (11 Oct 2018 21:17) (89 - 109)  BP: 120/60 (11 Oct 2018 21:17) (110/72 - 127/84)  BP(mean): --  RR: 18 (11 Oct 2018 21:17) (18 - 18)  SpO2: 99% (11 Oct 2018 21:17) (98% - 99%)  --------------------------------------------------------------------------------------    OTHER LABS    IMAGING RESULTS  CT:    LOWER CHEST: Within normal limits.    LIVER: Within normal limits.  BILE DUCTS: Normal caliber.  GALLBLADDER: Within normal limits.  SPLEEN: Within normal limits.  PANCREAS: Within normal limits.  ADRENALS: Within normal limits.  KIDNEYS/URETERS: Nonobstructing renal calculi in the right upper and   lower pole measuring up to 0.6 cm. No hydronephrosis.    BLADDER: Within normal limits.  REPRODUCTIVE ORGANS: The prostate is within normal limits.    BOWEL: Two high density linear objects measuring up to 0.5 cm in the   proximal/third portion of the duodenum (series 2 image 53 and 51).   Additional punctate high density material noted in the jejunum (series 2   image 57 and 62). No bowel obstruction.   PERITONEUM: No ascites or free air.  VESSELS:  Within normal limits.  RETROPERITONEUM: No lymphadenopathy.    ABDOMINAL WALL: Within normal limits.  BONES: Mild scoliotic curvature.    IMPRESSION:   High density linear objects in the proximal duodenum may represent   fragments of glass. Additional punctate densities in the jejunum,   possibly additional fragments.    ZORAIDA MARSHALL M.D., RADIOLOGY FELLOW  This document has been electronically signed.  CHARLES PETERSON M.D., ATTENDING RADIOLOGIST Surger Consultation Note  =====================================================  HPI:   23M w/ CP, severe MR, mood disorder with baseline self-harming behavior admitted for swallowing glass. Patient noncommunicative and unable to provide history, but per HPI patient attempted to eat glass display case. On CT scan, patient noted to have possible glass fragments in the duodenum and jejunum. Per staff members at bedside, patient is at baseline agitation and they do not believe that he is showing signs that he is in pain. Unable to assess ROS. Surgery is consulted for management of glass fragment ingestion.     HPI:  24 y/o M with cerebral palsy, severe MR (nonverbal,) and mood disorder with self harming behavior presents to the ED after swallowing glass.  Staff from [facility] report that pt was eating this am when he took a glass display plaque from the wall, and bit off a piece.  Staff members were able to remove most of the glass fragments from his mouth, but they think he swallowed some.  Pt is not able to communicate pain or discomfort, but has been at baseline with typical intermittent agitation per  staff.     In the ED, pt was evaluated by gastroenterology. (10 Oct 2018 19:21)    Allergies:   PAST MEDICAL & SURGICAL HISTORY:  Mood disorder  MR (mental retardation)  Cerebral palsy  No significant past surgical history    FAMILY HISTORY:  Family history unknown    SOCIAL HISTORY:     ADVANCE DIRECTIVES: Presumed Full Code      REVIEW OF SYSTEMS:    General: Non-Contributory  Skin/Breast: Non-Contributory  Ophthalmologic: Non-Contributory  ENMT: Non-Contributory  Respiratory and Thorax: Non-Contributory  Cardiovascular: Non-Contributory  Gastrointestinal: Non-Contributory  Genitourinary: Non-Contributory  Musculoskeletal: Non-Contributory  Neurological: Non-Contributory  Psychiatric: Non-Contributory  Hematology/Lymphatics: Non-Contributory  Endocrine: Non-Contributory  Allergic/Immunologic: Non-Contributory    HOME MEDICATIONS:    Home Medications:  Depakote  mg oral tablet, extended release: 1 tab(s) orally 3 times a day (10 Oct 2018 18:57)  lithium carbonate extended release: 450 milligram(s) orally once a day (at bedtime) (10 Oct 2018 18:57)  QUEtiapine 400 mg oral tablet: 1 tab(s) orally 2 times a day (10 Oct 2018 18:57)  risperiDONE 3 mg oral tablet: 1 tab(s) orally once a day (at bedtime) (10 Oct 2018 18:57)    CURRENT MEDICATIONS:   --------------------------------------------------------------------------------------  Neurologic Medications  lithium CR (ESKALITH-CR) 450 milliGRAM(s) Oral at bedtime  LORazepam     Tablet 2 milliGRAM(s) Oral every 6 hours PRN Agitation  LORazepam   Injectable 2 milliGRAM(s) IV Push every 6 hours PRN Severe Agitation  QUEtiapine 400 milliGRAM(s) Oral two times a day  risperiDONE   Tablet 3 milliGRAM(s) Oral at bedtime    Respiratory Medications    Cardiovascular Medications    Gastrointestinal Medications  cholecalciferol 2000 Unit(s) Oral daily    Genitourinary Medications    Hematologic/Oncologic Medications  influenza   Vaccine 0.5 milliLiter(s) IntraMuscular once    Antimicrobial/Immunologic Medications    Endocrine/Metabolic Medications    Topical/Other Medications    --------------------------------------------------------------------------------------    VITAL SIGNS, INS/OUTS (last 24 hours):  --------------------------------------------------------------------------------------  Vital Signs Last 24 Hrs  T(C): 36.2 (11 Oct 2018 21:17), Max: 36.4 (11 Oct 2018 05:41)  T(F): 97.2 (11 Oct 2018 21:17), Max: 97.6 (11 Oct 2018 13:48)  HR: 100 (11 Oct 2018 21:17) (89 - 109)  BP: 120/60 (11 Oct 2018 21:17) (110/72 - 127/84)  BP(mean): --  RR: 18 (11 Oct 2018 21:17) (18 - 18)  SpO2: 99% (11 Oct 2018 21:17) (98% - 99%)  --------------------------------------------------------------------------------------    EXAM  General: NAD, sleeping  Abdomen: soft nt nt     LABS  --------------------------------------------------------------------------------------  Vital Signs Last 24 Hrs  T(C): 36.2 (11 Oct 2018 21:17), Max: 36.4 (11 Oct 2018 05:41)  T(F): 97.2 (11 Oct 2018 21:17), Max: 97.6 (11 Oct 2018 13:48)  HR: 100 (11 Oct 2018 21:17) (89 - 109)  BP: 120/60 (11 Oct 2018 21:17) (110/72 - 127/84)  BP(mean): --  RR: 18 (11 Oct 2018 21:17) (18 - 18)  SpO2: 99% (11 Oct 2018 21:17) (98% - 99%)  --------------------------------------------------------------------------------------    OTHER LABS    IMAGING RESULTS  CT:    LOWER CHEST: Within normal limits.    LIVER: Within normal limits.  BILE DUCTS: Normal caliber.  GALLBLADDER: Within normal limits.  SPLEEN: Within normal limits.  PANCREAS: Within normal limits.  ADRENALS: Within normal limits.  KIDNEYS/URETERS: Nonobstructing renal calculi in the right upper and   lower pole measuring up to 0.6 cm. No hydronephrosis.    BLADDER: Within normal limits.  REPRODUCTIVE ORGANS: The prostate is within normal limits.    BOWEL: Two high density linear objects measuring up to 0.5 cm in the   proximal/third portion of the duodenum (series 2 image 53 and 51).   Additional punctate high density material noted in the jejunum (series 2   image 57 and 62). No bowel obstruction.   PERITONEUM: No ascites or free air.  VESSELS:  Within normal limits.  RETROPERITONEUM: No lymphadenopathy.    ABDOMINAL WALL: Within normal limits.  BONES: Mild scoliotic curvature.    IMPRESSION:   High density linear objects in the proximal duodenum may represent   fragments of glass. Additional punctate densities in the jejunum,   possibly additional fragments.    ZORAIDA MARSHALL M.D., RADIOLOGY FELLOW  This document has been electronically signed.  CHARLES PETERSON M.D., ATTENDING RADIOLOGIST

## 2018-10-11 NOTE — BEHAVIORAL HEALTH ASSESSMENT NOTE - NSBHCHARTREVIEWLAB_PSY_A_CORE FT
13.6   7.12  )-----------( 136      ( 10 Oct 2018 10:53 )             41.4   10-10    139  |  97<L>  |  6<L>  ----------------------------<  89  4.3   |  27  |  0.66    Ca    9.6      10 Oct 2018 10:53    TPro  7.7  /  Alb  4.2  /  TBili  0.3  /  DBili  x   /  AST  30  /  ALT  18  /  AlkPhos  65  10-10

## 2018-10-11 NOTE — BEHAVIORAL HEALTH ASSESSMENT NOTE - NSBHCHARTREVIEWVS_PSY_A_CORE FT
Vital Signs Last 24 Hrs  T(C): 36.4 (11 Oct 2018 05:41), Max: 37.6 (10 Oct 2018 13:40)  T(F): 97.5 (11 Oct 2018 05:41), Max: 99.7 (10 Oct 2018 13:40)  HR: 89 (11 Oct 2018 05:41) (89 - 117)  BP: 110/72 (11 Oct 2018 05:41) (110/72 - 141/92)  BP(mean): --  RR: 18 (11 Oct 2018 05:41) (16 - 18)  SpO2: 99% (11 Oct 2018 05:41) (98% - 100%)

## 2018-10-11 NOTE — BEHAVIORAL HEALTH ASSESSMENT NOTE - HPI (INCLUDE ILLNESS QUALITY, SEVERITY, DURATION, TIMING, CONTEXT, MODIFYING FACTORS, ASSOCIATED SIGNS AND SYMPTOMS)
ID:  24 y/o M with cerebral palsy, severe MR (nonverbal,) and mood disorder with self harming behavior presents to the ED after swallowing glass.      HPI:  Group home staff (Eleni Tejeda [phone]) who has worked with patient over the past 6 months since he lived in the group home, reports frequent episodes of aggressive and unpredictable behavior, often involving self-injury. Prior to admission, patient was observed breaking glass from display plaque from the wall, and bit off pieces of glass. Staff members intervened and were able to remove most of the glass fragments from his mouth. Patient also frequently hits his head with his hand or bangs his head against the wall, for which he is wearing a helmet. Patient is non-verbal and has minimal non-verbal communication, such as rubbing his chest. He is unable to communicate pain or discomfort, and shows sensitivity to noise. He has frequent bursts of agitation with violent behavior, including biting people. Patient was recently started on lithium which reportedly has been helpful for his aggression and impulsivity. On the unit, patient is accompanied by his health aide, ambulating in wheelchair, not making eye contact. His sleep has been normal.

## 2018-10-11 NOTE — BEHAVIORAL HEALTH ASSESSMENT NOTE - NSBHCONSULTMEDS_PSY_A_CORE FT
Recommendations:   - Continue lithium CR 450mg po qhs   - Continue risperidone 3mg po daily   - Continue Seroquel 400mg po BID   - Ativan 2mg IVP PRN q6h for severe agitation

## 2018-10-11 NOTE — BEHAVIORAL HEALTH ASSESSMENT NOTE - RISK ASSESSMENT
Patient is at chronically elevated risk for self-harm and harming others, and will require close observation. During episodes of agitation/aggression patient may require manual restraint for PRN med administration.

## 2018-10-12 DIAGNOSIS — F79 UNSPECIFIED INTELLECTUAL DISABILITIES: ICD-10-CM

## 2018-10-12 LAB
BUN SERPL-MCNC: 10 MG/DL — SIGNIFICANT CHANGE UP (ref 7–23)
CALCIUM SERPL-MCNC: 9 MG/DL — SIGNIFICANT CHANGE UP (ref 8.4–10.5)
CHLORIDE SERPL-SCNC: 100 MMOL/L — SIGNIFICANT CHANGE UP (ref 98–107)
CO2 SERPL-SCNC: 26 MMOL/L — SIGNIFICANT CHANGE UP (ref 22–31)
CREAT SERPL-MCNC: 0.67 MG/DL — SIGNIFICANT CHANGE UP (ref 0.5–1.3)
GLUCOSE SERPL-MCNC: 84 MG/DL — SIGNIFICANT CHANGE UP (ref 70–99)
HCT VFR BLD CALC: 42 % — SIGNIFICANT CHANGE UP (ref 39–50)
HGB BLD-MCNC: 14 G/DL — SIGNIFICANT CHANGE UP (ref 13–17)
MCHC RBC-ENTMCNC: 30.8 PG — SIGNIFICANT CHANGE UP (ref 27–34)
MCHC RBC-ENTMCNC: 33.3 % — SIGNIFICANT CHANGE UP (ref 32–36)
MCV RBC AUTO: 92.5 FL — SIGNIFICANT CHANGE UP (ref 80–100)
NRBC # FLD: 0 — SIGNIFICANT CHANGE UP
PLATELET # BLD AUTO: 153 K/UL — SIGNIFICANT CHANGE UP (ref 150–400)
PMV BLD: 10.5 FL — SIGNIFICANT CHANGE UP (ref 7–13)
POTASSIUM SERPL-MCNC: 4.2 MMOL/L — SIGNIFICANT CHANGE UP (ref 3.5–5.3)
POTASSIUM SERPL-SCNC: 4.2 MMOL/L — SIGNIFICANT CHANGE UP (ref 3.5–5.3)
RBC # BLD: 4.54 M/UL — SIGNIFICANT CHANGE UP (ref 4.2–5.8)
RBC # FLD: 14.1 % — SIGNIFICANT CHANGE UP (ref 10.3–14.5)
SODIUM SERPL-SCNC: 139 MMOL/L — SIGNIFICANT CHANGE UP (ref 135–145)
WBC # BLD: 11.07 K/UL — HIGH (ref 3.8–10.5)
WBC # FLD AUTO: 11.07 K/UL — HIGH (ref 3.8–10.5)

## 2018-10-12 PROCEDURE — 99233 SBSQ HOSP IP/OBS HIGH 50: CPT

## 2018-10-12 PROCEDURE — 74018 RADEX ABDOMEN 1 VIEW: CPT | Mod: 26

## 2018-10-12 RX ORDER — DOCUSATE SODIUM 100 MG
100 CAPSULE ORAL THREE TIMES A DAY
Qty: 0 | Refills: 0 | Status: DISCONTINUED | OUTPATIENT
Start: 2018-10-12 | End: 2018-10-16

## 2018-10-12 RX ORDER — SENNA PLUS 8.6 MG/1
2 TABLET ORAL AT BEDTIME
Qty: 0 | Refills: 0 | Status: DISCONTINUED | OUTPATIENT
Start: 2018-10-12 | End: 2018-10-16

## 2018-10-12 RX ORDER — POLYETHYLENE GLYCOL 3350 17 G/17G
17 POWDER, FOR SOLUTION ORAL
Qty: 0 | Refills: 0 | Status: DISCONTINUED | OUTPATIENT
Start: 2018-10-12 | End: 2018-10-16

## 2018-10-12 RX ORDER — LACTULOSE 10 G/15ML
20 SOLUTION ORAL EVERY 6 HOURS
Qty: 0 | Refills: 0 | Status: COMPLETED | OUTPATIENT
Start: 2018-10-12 | End: 2018-10-13

## 2018-10-12 RX ADMIN — SENNA PLUS 2 TABLET(S): 8.6 TABLET ORAL at 21:23

## 2018-10-12 RX ADMIN — Medication 2000 UNIT(S): at 13:10

## 2018-10-12 RX ADMIN — LITHIUM CARBONATE 450 MILLIGRAM(S): 300 TABLET, EXTENDED RELEASE ORAL at 21:23

## 2018-10-12 RX ADMIN — Medication 100 MILLIGRAM(S): at 21:23

## 2018-10-12 RX ADMIN — QUETIAPINE FUMARATE 400 MILLIGRAM(S): 200 TABLET, FILM COATED ORAL at 21:23

## 2018-10-12 RX ADMIN — Medication 100 MILLIGRAM(S): at 13:09

## 2018-10-12 RX ADMIN — Medication 2 MILLIGRAM(S): at 11:22

## 2018-10-12 RX ADMIN — Medication 2 MILLIGRAM(S): at 17:45

## 2018-10-12 RX ADMIN — LACTULOSE 20 GRAM(S): 10 SOLUTION ORAL at 13:10

## 2018-10-12 RX ADMIN — POLYETHYLENE GLYCOL 3350 17 GRAM(S): 17 POWDER, FOR SOLUTION ORAL at 19:08

## 2018-10-12 RX ADMIN — QUETIAPINE FUMARATE 400 MILLIGRAM(S): 200 TABLET, FILM COATED ORAL at 09:22

## 2018-10-12 RX ADMIN — RISPERIDONE 3 MILLIGRAM(S): 4 TABLET ORAL at 21:23

## 2018-10-12 RX ADMIN — LACTULOSE 20 GRAM(S): 10 SOLUTION ORAL at 19:08

## 2018-10-12 NOTE — PROGRESS NOTE ADULT - PROBLEM SELECTOR PLAN 1
case d/w GI  on CT fragments appear to be past pylorus, so unable to retrieve, PO as tolerates for now, appreciate surg input - f/u clinically, serial AXR; cathartics, prune juice  - monitor closely for abdominal pain, fever, inc WBC

## 2018-10-12 NOTE — PROGRESS NOTE ADULT - ASSESSMENT
ASSESSMENT:  23M w/ CP, severe MR, mood disorder with baseline self-harming behavior admitted for swallowing glass. Surgery is consulted for management of glass fragment ingestion. At this time, patient is stable without signs of perforation and is hemodynamically stable. The glass shards have continued to progress through the bowel and will require continued abdominal exams and monitoring. Patient does not require surgical intervention to remove the shards unless patient becomes acutely peritonitic, febrile, tachycardic, hypotensive, or shows other overt signs of bleeding/perforation. Serial abdominal exams continue to be without signs of acute perforation--please contact surgery urgently if patient becomes increasingly agitated or indicates abdominal pain, loss of bowel function, or significant change in vitals.     Patient discussed with Dr. Berger.     Donita Amaya PGY-2  Surgery pager [phone]

## 2018-10-13 LAB
BUN SERPL-MCNC: 10 MG/DL — SIGNIFICANT CHANGE UP (ref 7–23)
CALCIUM SERPL-MCNC: 8.7 MG/DL — SIGNIFICANT CHANGE UP (ref 8.4–10.5)
CHLORIDE SERPL-SCNC: 100 MMOL/L — SIGNIFICANT CHANGE UP (ref 98–107)
CO2 SERPL-SCNC: 28 MMOL/L — SIGNIFICANT CHANGE UP (ref 22–31)
CREAT SERPL-MCNC: 0.68 MG/DL — SIGNIFICANT CHANGE UP (ref 0.5–1.3)
GLUCOSE SERPL-MCNC: 92 MG/DL — SIGNIFICANT CHANGE UP (ref 70–99)
HCT VFR BLD CALC: 38.4 % — LOW (ref 39–50)
HGB BLD-MCNC: 12.6 G/DL — LOW (ref 13–17)
MCHC RBC-ENTMCNC: 29.7 PG — SIGNIFICANT CHANGE UP (ref 27–34)
MCHC RBC-ENTMCNC: 32.8 % — SIGNIFICANT CHANGE UP (ref 32–36)
MCV RBC AUTO: 90.6 FL — SIGNIFICANT CHANGE UP (ref 80–100)
NRBC # FLD: 0 — SIGNIFICANT CHANGE UP
PLATELET # BLD AUTO: 160 K/UL — SIGNIFICANT CHANGE UP (ref 150–400)
PMV BLD: 11 FL — SIGNIFICANT CHANGE UP (ref 7–13)
POTASSIUM SERPL-MCNC: 4.2 MMOL/L — SIGNIFICANT CHANGE UP (ref 3.5–5.3)
POTASSIUM SERPL-SCNC: 4.2 MMOL/L — SIGNIFICANT CHANGE UP (ref 3.5–5.3)
RBC # BLD: 4.24 M/UL — SIGNIFICANT CHANGE UP (ref 4.2–5.8)
RBC # FLD: 13.9 % — SIGNIFICANT CHANGE UP (ref 10.3–14.5)
SODIUM SERPL-SCNC: 140 MMOL/L — SIGNIFICANT CHANGE UP (ref 135–145)
WBC # BLD: 5.68 K/UL — SIGNIFICANT CHANGE UP (ref 3.8–10.5)
WBC # FLD AUTO: 5.68 K/UL — SIGNIFICANT CHANGE UP (ref 3.8–10.5)

## 2018-10-13 PROCEDURE — 99233 SBSQ HOSP IP/OBS HIGH 50: CPT

## 2018-10-13 RX ORDER — MINERAL OIL
133 OIL (ML) MISCELLANEOUS ONCE
Qty: 0 | Refills: 0 | Status: COMPLETED | OUTPATIENT
Start: 2018-10-13 | End: 2018-10-13

## 2018-10-13 RX ADMIN — QUETIAPINE FUMARATE 400 MILLIGRAM(S): 200 TABLET, FILM COATED ORAL at 05:30

## 2018-10-13 RX ADMIN — POLYETHYLENE GLYCOL 3350 17 GRAM(S): 17 POWDER, FOR SOLUTION ORAL at 17:21

## 2018-10-13 RX ADMIN — LACTULOSE 20 GRAM(S): 10 SOLUTION ORAL at 05:30

## 2018-10-13 RX ADMIN — RISPERIDONE 3 MILLIGRAM(S): 4 TABLET ORAL at 22:09

## 2018-10-13 RX ADMIN — POLYETHYLENE GLYCOL 3350 17 GRAM(S): 17 POWDER, FOR SOLUTION ORAL at 05:30

## 2018-10-13 RX ADMIN — QUETIAPINE FUMARATE 400 MILLIGRAM(S): 200 TABLET, FILM COATED ORAL at 17:21

## 2018-10-13 RX ADMIN — SENNA PLUS 2 TABLET(S): 8.6 TABLET ORAL at 22:09

## 2018-10-13 RX ADMIN — Medication 100 MILLIGRAM(S): at 05:30

## 2018-10-13 RX ADMIN — Medication 2000 UNIT(S): at 13:05

## 2018-10-13 RX ADMIN — Medication 133 MILLILITER(S): at 15:13

## 2018-10-13 RX ADMIN — Medication 100 MILLIGRAM(S): at 13:05

## 2018-10-13 RX ADMIN — LITHIUM CARBONATE 450 MILLIGRAM(S): 300 TABLET, EXTENDED RELEASE ORAL at 22:09

## 2018-10-13 RX ADMIN — Medication 2 MILLIGRAM(S): at 14:13

## 2018-10-13 RX ADMIN — Medication 2 MILLIGRAM(S): at 23:19

## 2018-10-13 RX ADMIN — Medication 100 MILLIGRAM(S): at 22:09

## 2018-10-13 NOTE — PROGRESS NOTE ADULT - PROBLEM SELECTOR PLAN 1
on CT fragments appear to be past pylorus, so unable to retrieve, PO as tolerates for now, appreciate surg input - f/u clinically, serial AXR; cathartics, prune juice  - monitor closely for abdominal pain, fever, inc WBC

## 2018-10-14 LAB
BUN SERPL-MCNC: 16 MG/DL — SIGNIFICANT CHANGE UP (ref 7–23)
CALCIUM SERPL-MCNC: 9.6 MG/DL — SIGNIFICANT CHANGE UP (ref 8.4–10.5)
CHLORIDE SERPL-SCNC: 99 MMOL/L — SIGNIFICANT CHANGE UP (ref 98–107)
CO2 SERPL-SCNC: 26 MMOL/L — SIGNIFICANT CHANGE UP (ref 22–31)
CREAT SERPL-MCNC: 0.66 MG/DL — SIGNIFICANT CHANGE UP (ref 0.5–1.3)
GLUCOSE SERPL-MCNC: 108 MG/DL — HIGH (ref 70–99)
LITHIUM SERPL-MCNC: 0.47 MMOL/L — LOW (ref 0.6–1.2)
POTASSIUM SERPL-MCNC: 4.3 MMOL/L — SIGNIFICANT CHANGE UP (ref 3.5–5.3)
POTASSIUM SERPL-SCNC: 4.3 MMOL/L — SIGNIFICANT CHANGE UP (ref 3.5–5.3)
SODIUM SERPL-SCNC: 138 MMOL/L — SIGNIFICANT CHANGE UP (ref 135–145)

## 2018-10-14 PROCEDURE — 99233 SBSQ HOSP IP/OBS HIGH 50: CPT

## 2018-10-14 RX ORDER — DIVALPROEX SODIUM 500 MG/1
500 TABLET, DELAYED RELEASE ORAL THREE TIMES A DAY
Qty: 0 | Refills: 0 | Status: DISCONTINUED | OUTPATIENT
Start: 2018-10-14 | End: 2018-10-16

## 2018-10-14 RX ORDER — LACTULOSE 10 G/15ML
20 SOLUTION ORAL EVERY 6 HOURS
Qty: 0 | Refills: 0 | Status: COMPLETED | OUTPATIENT
Start: 2018-10-14 | End: 2018-10-16

## 2018-10-14 RX ADMIN — Medication 100 MILLIGRAM(S): at 13:53

## 2018-10-14 RX ADMIN — Medication 2 MILLIGRAM(S): at 05:19

## 2018-10-14 RX ADMIN — SENNA PLUS 2 TABLET(S): 8.6 TABLET ORAL at 22:07

## 2018-10-14 RX ADMIN — QUETIAPINE FUMARATE 400 MILLIGRAM(S): 200 TABLET, FILM COATED ORAL at 05:34

## 2018-10-14 RX ADMIN — LACTULOSE 20 GRAM(S): 10 SOLUTION ORAL at 22:00

## 2018-10-14 RX ADMIN — POLYETHYLENE GLYCOL 3350 17 GRAM(S): 17 POWDER, FOR SOLUTION ORAL at 17:06

## 2018-10-14 RX ADMIN — DIVALPROEX SODIUM 500 MILLIGRAM(S): 500 TABLET, DELAYED RELEASE ORAL at 22:01

## 2018-10-14 RX ADMIN — Medication 100 MILLIGRAM(S): at 05:34

## 2018-10-14 RX ADMIN — RISPERIDONE 3 MILLIGRAM(S): 4 TABLET ORAL at 21:58

## 2018-10-14 RX ADMIN — LACTULOSE 20 GRAM(S): 10 SOLUTION ORAL at 17:05

## 2018-10-14 RX ADMIN — Medication 2 MILLIGRAM(S): at 22:00

## 2018-10-14 RX ADMIN — POLYETHYLENE GLYCOL 3350 17 GRAM(S): 17 POWDER, FOR SOLUTION ORAL at 05:34

## 2018-10-14 RX ADMIN — QUETIAPINE FUMARATE 400 MILLIGRAM(S): 200 TABLET, FILM COATED ORAL at 17:06

## 2018-10-14 RX ADMIN — Medication 2000 UNIT(S): at 13:53

## 2018-10-14 RX ADMIN — LITHIUM CARBONATE 450 MILLIGRAM(S): 300 TABLET, EXTENDED RELEASE ORAL at 21:58

## 2018-10-14 RX ADMIN — Medication 2 MILLIGRAM(S): at 17:26

## 2018-10-14 RX ADMIN — Medication 100 MILLIGRAM(S): at 21:58

## 2018-10-14 NOTE — DISCHARGE NOTE ADULT - PROVIDER TOKENS
TOKEN:'[id number]' FREE:[LAST:[[facility] doctor],PHONE:[(   )    -],FAX:[(   )    -],ADDRESS:[the doctor from the group home]]

## 2018-10-14 NOTE — DISCHARGE NOTE ADULT - CARE PLAN
Principal Discharge DX:	Foreign body, swallowed  Goal:	Avoid swallowing Foreign bodies  Assessment and plan of treatment:	Follow up with PMD as out pt if any symptoms arise  Secondary Diagnosis:	MR (mental retardation)  Assessment and plan of treatment:	F/u at the group home with Psych follow up Principal Discharge DX:	Foreign body, swallowed  Goal:	Avoid swallowing Foreign bodies  Assessment and plan of treatment:	Follow up with PMD within one week  Secondary Diagnosis:	MR (mental retardation)  Goal:	stable  Assessment and plan of treatment:	Follow up at the group home with Psych follow up Principal Discharge DX:	Foreign body, swallowed  Goal:	Avoid swallowing Foreign bodies  Assessment and plan of treatment:	Follow up with PMD within one week. Call to schedule follow up appointment.  Please continue taking all home medications as directed. Diet and activity as tolerated. Ensure regular bowel movements.  Secondary Diagnosis:	MR (mental retardation)  Goal:	stable  Assessment and plan of treatment:	Follow up at the group home with Psych follow up. Principal Discharge DX:	Foreign body, swallowed  Goal:	Avoid swallowing Foreign bodies  Assessment and plan of treatment:	Follow up with PMD within one week. Call to schedule follow up appointment.  Please continue taking all home medications as directed. Diet and activity as tolerated. Pt was very constipated, required enemas while in hospital.  Ensure regular bowel movements daily.  Secondary Diagnosis:	MR (mental retardation)  Goal:	stable  Assessment and plan of treatment:	Follow up at the group home with Psych follow up.

## 2018-10-14 NOTE — DISCHARGE NOTE ADULT - PLAN OF CARE
Avoid swallowing Foreign bodies Follow up with PMD as out pt if any symptoms arise F/u at the group home with Psych follow up Follow up with PMD within one week stable Follow up at the group home with Psych follow up Follow up with PMD within one week. Call to schedule follow up appointment.  Please continue taking all home medications as directed. Diet and activity as tolerated. Ensure regular bowel movements. Follow up at the group home with Psych follow up. Follow up with PMD within one week. Call to schedule follow up appointment.  Please continue taking all home medications as directed. Diet and activity as tolerated. Pt was very constipated, required enemas while in hospital.  Ensure regular bowel movements daily.

## 2018-10-14 NOTE — DISCHARGE NOTE ADULT - ADDITIONAL INSTRUCTIONS
Dr. Urszula Ngo, [phone]  Pharmacy: [facility]   Psychiatrist: Dr. Duglas Alva, [phone]

## 2018-10-14 NOTE — DISCHARGE NOTE ADULT - CARE PROVIDER_API CALL
Phillip Berger), Surgery; Surgical Critical Care  [address] [place] [address]  Phone: [phone]  Fax: [phone] [facility] doctor,   the doctor from the group home  Phone: (   )    -  Fax: (   )    -

## 2018-10-14 NOTE — DISCHARGE NOTE ADULT - PATIENT PORTAL LINK FT
You can access the [facility] Patient Portal, offered by [facility], by registering with the following website: [email or web address]

## 2018-10-14 NOTE — DISCHARGE NOTE ADULT - MEDICATION SUMMARY - MEDICATIONS TO TAKE
I will START or STAY ON the medications listed below when I get home from the hospital:    Depakote  mg oral tablet, extended release  -- 1 tab(s) by mouth 3 times a day  -- Indication: For Mood disorder    QUEtiapine 400 mg oral tablet  -- 1 tab(s) by mouth 2 times a day  -- Indication: For Mood disorder    risperiDONE 3 mg oral tablet  -- 1 tab(s) by mouth once a day (at bedtime)  -- Indication: For MR (mental retardation)    lithium carbonate extended release  -- 450 milligram(s) by mouth once a day (at bedtime)  -- Indication: For Mood disorder    Balmex Adult Care 11.3% topical cream  -- Apply on skin to affected area 2 times a day   -- For external use only.    -- Indication: For topical     Cleocin T 1% topical lotion  -- Apply on skin to affected area 2 times a day   -- For external use only.    -- Indication: For topical     guaiFENesin 400 mg oral tablet  -- 1 tab(s) by mouth every 6 hours, As Needed MDD:3   -- Medication should be taken with plenty of water.    -- Indication: For cough     senna oral tablet  -- 2 tab(s) by mouth once a day (at bedtime)  -- Indication: For bowel regimen     docusate sodium 100 mg oral capsule  -- 1 cap(s) by mouth 3 times a day  -- Indication: For bowel regimen     polyethylene glycol 3350 oral powder for reconstitution  -- 17 gram(s) by mouth 2 times a day  -- Indication: For bowel regimen     magnesium gluconate 500 mg oral tablet  -- 1 tab(s) by mouth 2 times a day   -- Indication: For Supplement     Omega-3 1000 mg oral capsule  -- 2 cap(s) by mouth 2 times a day   -- Medication should be taken with plenty of water.  Take with food.    -- Indication: For Supplement     Vitamin D3 1000 intl units oral tablet  -- 2 tab(s) by mouth once a day   -- Indication: For Supplement I will START or STAY ON the medications listed below when I get home from the hospital:    Depakote  mg oral tablet, extended release  -- 1 tab(s) by mouth 3 times a day  -- Indication: For Mood disorder    QUEtiapine 400 mg oral tablet  -- 1 tab(s) by mouth 2 times a day  -- Indication: For Mood disorder    risperiDONE 3 mg oral tablet  -- 1 tab(s) by mouth once a day (at bedtime)  -- Indication: For MR (mental retardation)    lithium carbonate extended release  -- 450 milligram(s) by mouth once a day (at bedtime)  -- Indication: For Mood disorder    Balmex Adult Care 11.3% topical cream  -- Apply on skin to affected area 2 times a day   -- For external use only.    -- Indication: For topical     Cleocin T 1% topical lotion  -- Apply on skin to affected area 2 times a day   -- For external use only.    -- Indication: For topical     guaiFENesin 400 mg oral tablet  -- 1 tab(s) by mouth every 6 hours, As Needed MDD:3   -- Medication should be taken with plenty of water.    -- Indication: For cough     polyethylene glycol 3350 oral powder for reconstitution  -- 17 gram(s) by mouth 2 times a day  -- Indication: For Swallowed foreign body, initial encounter    senna oral tablet  -- 2 tab(s) by mouth once a day (at bedtime)  -- Indication: For Swallowed foreign body, initial encounter    docusate sodium 100 mg oral capsule  -- 1 cap(s) by mouth 3 times a day  -- Indication: For Swallowed foreign body, initial encounter    magnesium gluconate 500 mg oral tablet  -- 1 tab(s) by mouth 2 times a day   -- Indication: For Supplement     Omega-3 1000 mg oral capsule  -- 2 cap(s) by mouth 2 times a day   -- Medication should be taken with plenty of water.  Take with food.    -- Indication: For Supplement     Vitamin D3 1000 intl units oral tablet  -- 2 tab(s) by mouth once a day   -- Indication: For Supplement

## 2018-10-14 NOTE — DISCHARGE NOTE ADULT - CARE PROVIDERS DIRECT ADDRESSES
[email or web address] ,[email or web address]

## 2018-10-14 NOTE — DISCHARGE NOTE ADULT - HOSPITAL COURSE
23M MR, mood disorder, self injurious behavior presents after glass ingestion.      Swallowed foreign body, initial encounter.  on CT fragments appear to be past pylorus, so unable to retrieve, PO as tolerates for now, appreciate surg input - f/u clinically, serial AXR; cathartics, prune juice       Mood disorder  appreciate psych input - continue home lithium,  risperidone, and Seroquel; Ativan PRN.      MR (mental retardation).     aides with pt, supportive care  avoid BZD/Ambien/anticholinergics, day/night cues, supportive care. 23M MR, mood disorder, self injurious behavior presents after glass ingestion.      Swallowed foreign body, initial encounter.  on CT fragments appear to be past pylorus, so unable to retrieve, PO as tolerates for now, appreciate surg input - f/u clinically, serial AXR; cathartics, prune juice       Mood disorder  appreciate psych input - continue home lithium,  risperidone, and Seroquel; Ativan PRN.      MR (mental retardation).     aides with pt, supportive care  avoid BZD/Ambien/anticholinergics, day/night cues, supportive care.     Aggressive Bowel regimen with SMOG enema and pt was able to defecate  Rpt Xrays with no foreign body noted   Dispo- Return to Group home 23M MR, mood disorder, self injurious behavior presents after glass ingestion.      Swallowed foreign body, initial encounter.  on CT fragments appear to be past pylorus, so unable to retrieve  Tolerating PO  appreciate surg input - f/u clinically, serial AXR; cathartics, prune juice    Mood disorder  appreciate psych input - continue home lithium,  risperidone, and Seroquel; Ativan PRN.      MR (mental retardation).     aides with pt, supportive care  avoid BZD/Ambien/anticholinergics, day/night cues, supportive care.     Aggressive Bowel regimen with SMOG enema and pt was able to defecate  Rpt Xrays with no foreign body noted   Dispo- Return to Group home 23M MR, mood disorder, self injurious behavior presents after glass ingestion.      Swallowed foreign body - CT fragments appear to be past pylorus, so unable to retrieve endoscopically.  Monitored closely for fever, elevated WBCs, any signs distress, surgery followed.  +BMs after cathartics and SMOG enema, as pt was very constipated.  Remained clinically stable.  AXR no evidence of retained foreign body.      Mood disorder - appreciate psych input - continue home lithium,  risperidone, depakote, Seroquel; Ativan PRN.      MR (mental retardation). aides with pt, supportive care  avoid BZD/Ambien/anticholinergics, day/night cues, supportive care.     Dispo- Return to Group home

## 2018-10-15 LAB
BUN SERPL-MCNC: 13 MG/DL — SIGNIFICANT CHANGE UP (ref 7–23)
BUN SERPL-MCNC: 13 MG/DL — SIGNIFICANT CHANGE UP (ref 7–23)
CALCIUM SERPL-MCNC: 9.6 MG/DL — SIGNIFICANT CHANGE UP (ref 8.4–10.5)
CALCIUM SERPL-MCNC: 9.7 MG/DL — SIGNIFICANT CHANGE UP (ref 8.4–10.5)
CHLORIDE SERPL-SCNC: 96 MMOL/L — LOW (ref 98–107)
CHLORIDE SERPL-SCNC: 99 MMOL/L — SIGNIFICANT CHANGE UP (ref 98–107)
CO2 SERPL-SCNC: 26 MMOL/L — SIGNIFICANT CHANGE UP (ref 22–31)
CO2 SERPL-SCNC: 26 MMOL/L — SIGNIFICANT CHANGE UP (ref 22–31)
CREAT SERPL-MCNC: 0.66 MG/DL — SIGNIFICANT CHANGE UP (ref 0.5–1.3)
CREAT SERPL-MCNC: 0.71 MG/DL — SIGNIFICANT CHANGE UP (ref 0.5–1.3)
GLUCOSE SERPL-MCNC: 102 MG/DL — HIGH (ref 70–99)
GLUCOSE SERPL-MCNC: 104 MG/DL — HIGH (ref 70–99)
HCT VFR BLD CALC: 41.1 % — SIGNIFICANT CHANGE UP (ref 39–50)
HGB BLD-MCNC: 13.6 G/DL — SIGNIFICANT CHANGE UP (ref 13–17)
MCHC RBC-ENTMCNC: 30.4 PG — SIGNIFICANT CHANGE UP (ref 27–34)
MCHC RBC-ENTMCNC: 33.1 % — SIGNIFICANT CHANGE UP (ref 32–36)
MCV RBC AUTO: 91.7 FL — SIGNIFICANT CHANGE UP (ref 80–100)
NRBC # FLD: 0 — SIGNIFICANT CHANGE UP
PLATELET # BLD AUTO: 197 K/UL — SIGNIFICANT CHANGE UP (ref 150–400)
PMV BLD: 11.5 FL — SIGNIFICANT CHANGE UP (ref 7–13)
POTASSIUM SERPL-MCNC: 3.7 MMOL/L — SIGNIFICANT CHANGE UP (ref 3.5–5.3)
POTASSIUM SERPL-MCNC: 4.5 MMOL/L — SIGNIFICANT CHANGE UP (ref 3.5–5.3)
POTASSIUM SERPL-SCNC: 3.7 MMOL/L — SIGNIFICANT CHANGE UP (ref 3.5–5.3)
POTASSIUM SERPL-SCNC: 4.5 MMOL/L — SIGNIFICANT CHANGE UP (ref 3.5–5.3)
RBC # BLD: 4.48 M/UL — SIGNIFICANT CHANGE UP (ref 4.2–5.8)
RBC # FLD: 14 % — SIGNIFICANT CHANGE UP (ref 10.3–14.5)
SODIUM SERPL-SCNC: 138 MMOL/L — SIGNIFICANT CHANGE UP (ref 135–145)
SODIUM SERPL-SCNC: 140 MMOL/L — SIGNIFICANT CHANGE UP (ref 135–145)
WBC # BLD: 8.34 K/UL — SIGNIFICANT CHANGE UP (ref 3.8–10.5)
WBC # FLD AUTO: 8.34 K/UL — SIGNIFICANT CHANGE UP (ref 3.8–10.5)

## 2018-10-15 PROCEDURE — 99233 SBSQ HOSP IP/OBS HIGH 50: CPT

## 2018-10-15 PROCEDURE — 74018 RADEX ABDOMEN 1 VIEW: CPT | Mod: 26

## 2018-10-15 RX ORDER — SENNA PLUS 8.6 MG/1
2 TABLET ORAL
Qty: 0 | Refills: 0 | COMMUNITY
Start: 2018-10-15

## 2018-10-15 RX ORDER — DOCUSATE SODIUM 100 MG
1 CAPSULE ORAL
Qty: 0 | Refills: 0 | COMMUNITY
Start: 2018-10-15

## 2018-10-15 RX ORDER — POLYETHYLENE GLYCOL 3350 17 G/17G
17 POWDER, FOR SOLUTION ORAL
Qty: 0 | Refills: 0 | COMMUNITY
Start: 2018-10-15

## 2018-10-15 RX ADMIN — DIVALPROEX SODIUM 500 MILLIGRAM(S): 500 TABLET, DELAYED RELEASE ORAL at 05:43

## 2018-10-15 RX ADMIN — LACTULOSE 20 GRAM(S): 10 SOLUTION ORAL at 18:15

## 2018-10-15 RX ADMIN — LACTULOSE 20 GRAM(S): 10 SOLUTION ORAL at 13:44

## 2018-10-15 RX ADMIN — RISPERIDONE 3 MILLIGRAM(S): 4 TABLET ORAL at 21:41

## 2018-10-15 RX ADMIN — Medication 100 MILLIGRAM(S): at 05:43

## 2018-10-15 RX ADMIN — DIVALPROEX SODIUM 500 MILLIGRAM(S): 500 TABLET, DELAYED RELEASE ORAL at 13:45

## 2018-10-15 RX ADMIN — SENNA PLUS 2 TABLET(S): 8.6 TABLET ORAL at 21:39

## 2018-10-15 RX ADMIN — Medication 2 MILLIGRAM(S): at 00:07

## 2018-10-15 RX ADMIN — Medication 100 MILLIGRAM(S): at 21:39

## 2018-10-15 RX ADMIN — LITHIUM CARBONATE 450 MILLIGRAM(S): 300 TABLET, EXTENDED RELEASE ORAL at 21:40

## 2018-10-15 RX ADMIN — QUETIAPINE FUMARATE 400 MILLIGRAM(S): 200 TABLET, FILM COATED ORAL at 18:15

## 2018-10-15 RX ADMIN — LACTULOSE 20 GRAM(S): 10 SOLUTION ORAL at 05:43

## 2018-10-15 RX ADMIN — QUETIAPINE FUMARATE 400 MILLIGRAM(S): 200 TABLET, FILM COATED ORAL at 05:43

## 2018-10-15 RX ADMIN — Medication 100 MILLIGRAM(S): at 13:42

## 2018-10-15 RX ADMIN — Medication 2000 UNIT(S): at 13:42

## 2018-10-15 RX ADMIN — POLYETHYLENE GLYCOL 3350 17 GRAM(S): 17 POWDER, FOR SOLUTION ORAL at 18:15

## 2018-10-15 RX ADMIN — POLYETHYLENE GLYCOL 3350 17 GRAM(S): 17 POWDER, FOR SOLUTION ORAL at 05:43

## 2018-10-15 RX ADMIN — DIVALPROEX SODIUM 500 MILLIGRAM(S): 500 TABLET, DELAYED RELEASE ORAL at 21:38

## 2018-10-15 RX ADMIN — Medication 2 MILLIGRAM(S): at 21:39

## 2018-10-15 NOTE — PROGRESS NOTE ADULT - ASSESSMENT
ASSESSMENT:  23M w/ CP, severe MR, mood disorder with baseline self-harming behavior admitted for swallowing glass. Surgery is consulted for management of glass fragment ingestion. At this time, patient is stable without signs of perforation and is hemodynamically stable. The glass shards have continued to progress through the bowel and will require continued abdominal exams and monitoring. Patient does not require surgical intervention to remove the shards unless patient becomes acutely peritonitic, febrile, tachycardic, hypotensive, or shows other overt signs of bleeding/perforation. Serial abdominal exams continue to be without signs of acute perforation--please contact surgery urgently if patient becomes increasingly agitated or indicates abdominal pain, loss of bowel function, or significant change in vitals. Surgery service to sign off.      Patient discussed with Dr. Berger.     Donita Amaya PGY-2  Surgery pager [phone] ASSESSMENT:  23M w/ CP, severe MR, mood disorder with baseline self-harming behavior admitted for swallowing glass. Surgery is consulted for management of glass fragment ingestion. At this time, patient is stable without signs of perforation and is hemodynamically stable. The glass shards have continued to progress through the bowel and will require continued abdominal exams and monitoring. Patient does not require surgical intervention to remove the shards unless patient becomes acutely peritonitic, febrile, tachycardic, hypotensive, or shows other overt signs of bleeding/perforation. Serial abdominal exams continue to be without signs of acute perforation--please contact surgery urgently if patient becomes increasingly agitated or indicates abdominal pain, loss of bowel function, or significant change in vitals. Surgery service to sign off.      Patient discussed with Dr. Berger.     Donita Amaya PGY-2  Surgery pager [phone]      Addendum:   Asked to evaluate the patient around 01:15 PM. Edgard has benign abdominal exam, he is mildly distended which is similar to his exam in the last 4 days. He is afebrile and his WBC is WNL. He does not need any surgical intervention, AXR noted, no concerning findings.     [id number]

## 2018-10-15 NOTE — PROGRESS NOTE ADULT - PROBLEM SELECTOR PLAN 1
on CT fragments appear to be past pylorus, so unable to retrieve, PO as tolerates for now, appreciate surg input - f/u clinically, serial AXR; cathartics, prune juice  - monitor closely for abdominal pain, fever, inc WBC appreciate surg input exam stable, reviewed xray with radiology no clear evidence of foreign body, will give another SMOG enema, c/w cathartics, prune juice, for constipation (very hard stool passed yesterday), if remains stable aim for d/c in am  - monitor closely for abdominal pain, fever, inc WBC

## 2018-10-16 VITALS
HEART RATE: 105 BPM | RESPIRATION RATE: 18 BRPM | OXYGEN SATURATION: 99 % | DIASTOLIC BLOOD PRESSURE: 74 MMHG | SYSTOLIC BLOOD PRESSURE: 124 MMHG | TEMPERATURE: 98 F

## 2018-10-16 LAB
HCT VFR BLD CALC: 36.9 % — LOW (ref 39–50)
HGB BLD-MCNC: 12.2 G/DL — LOW (ref 13–17)
MCHC RBC-ENTMCNC: 30.4 PG — SIGNIFICANT CHANGE UP (ref 27–34)
MCHC RBC-ENTMCNC: 33.1 % — SIGNIFICANT CHANGE UP (ref 32–36)
MCV RBC AUTO: 92 FL — SIGNIFICANT CHANGE UP (ref 80–100)
NRBC # FLD: 0 — SIGNIFICANT CHANGE UP
PLATELET # BLD AUTO: 177 K/UL — SIGNIFICANT CHANGE UP (ref 150–400)
PMV BLD: 11.6 FL — SIGNIFICANT CHANGE UP (ref 7–13)
RBC # BLD: 4.01 M/UL — LOW (ref 4.2–5.8)
RBC # FLD: 14 % — SIGNIFICANT CHANGE UP (ref 10.3–14.5)
WBC # BLD: 6.37 K/UL — SIGNIFICANT CHANGE UP (ref 3.8–10.5)
WBC # FLD AUTO: 6.37 K/UL — SIGNIFICANT CHANGE UP (ref 3.8–10.5)

## 2018-10-16 PROCEDURE — 99239 HOSP IP/OBS DSCHRG MGMT >30: CPT

## 2018-10-16 RX ORDER — SENNA PLUS 8.6 MG/1
2 TABLET ORAL
Qty: 60 | Refills: 0 | OUTPATIENT
Start: 2018-10-16 | End: 2018-11-14

## 2018-10-16 RX ORDER — DOCUSATE SODIUM 100 MG
1 CAPSULE ORAL
Qty: 90 | Refills: 0 | OUTPATIENT
Start: 2018-10-16 | End: 2018-11-14

## 2018-10-16 RX ORDER — POLYETHYLENE GLYCOL 3350 17 G/17G
17 POWDER, FOR SOLUTION ORAL
Qty: 1 | Refills: 0 | OUTPATIENT
Start: 2018-10-16 | End: 2018-11-14

## 2018-10-16 RX ADMIN — LACTULOSE 20 GRAM(S): 10 SOLUTION ORAL at 13:06

## 2018-10-16 RX ADMIN — Medication 100 MILLIGRAM(S): at 05:35

## 2018-10-16 RX ADMIN — Medication 2000 UNIT(S): at 13:06

## 2018-10-16 RX ADMIN — Medication 2 MILLIGRAM(S): at 02:30

## 2018-10-16 RX ADMIN — POLYETHYLENE GLYCOL 3350 17 GRAM(S): 17 POWDER, FOR SOLUTION ORAL at 05:36

## 2018-10-16 RX ADMIN — QUETIAPINE FUMARATE 400 MILLIGRAM(S): 200 TABLET, FILM COATED ORAL at 05:35

## 2018-10-16 RX ADMIN — DIVALPROEX SODIUM 500 MILLIGRAM(S): 500 TABLET, DELAYED RELEASE ORAL at 05:36

## 2018-10-16 RX ADMIN — LACTULOSE 20 GRAM(S): 10 SOLUTION ORAL at 05:35

## 2018-10-16 NOTE — PROGRESS NOTE ADULT - REASON FOR ADMISSION
Glass ingestion

## 2018-10-16 NOTE — PROGRESS NOTE ADULT - PROBLEM SELECTOR PLAN 1
appreciate surg input exam stable, reviewed xray with radiology no clear evidence of foreign body, will give another SMOG enema, c/w cathartics, prune juice, for constipation (very hard stool passed yesterday), WBC normal, afebrile, stable for d/c on bowel regimen

## 2018-10-16 NOTE — PROGRESS NOTE ADULT - SUBJECTIVE AND OBJECTIVE BOX
Chief Complaint:  Patient is a 23y old  Male who presents with a chief complaint of Glass ingestion (10 Oct 2018 19:21)    Interval Events:   No acute overnight events.    Allergies:  No Known Allergies    Hospital Medications:  cholecalciferol 2000 Unit(s) Oral daily  influenza   Vaccine 0.5 milliLiter(s) IntraMuscular once  lithium CR (ESKALITH-CR) 450 milliGRAM(s) Oral at bedtime  LORazepam   Injectable 2 milliGRAM(s) IV Push once  QUEtiapine 400 milliGRAM(s) Oral two times a day  risperiDONE   Tablet 3 milliGRAM(s) Oral at bedtime    PMHX/PSHX:  Mood disorder  MR (mental retardation)  Cerebral palsy  No significant past surgical history    ROS:   Unable to Assess     PHYSICAL EXAM:   Vital Signs:  Vital Signs Last 24 Hrs  T(C): 36.4 (11 Oct 2018 05:41), Max: 37.6 (10 Oct 2018 13:40)  T(F): 97.5 (11 Oct 2018 05:41), Max: 99.7 (10 Oct 2018 13:40)  HR: 89 (11 Oct 2018 05:41) (89 - 117)  BP: 110/72 (11 Oct 2018 05:41) (110/72 - 141/92)  BP(mean): --  RR: 18 (11 Oct 2018 05:41) (16 - 18)  SpO2: 99% (11 Oct 2018 05:41) (98% - 100%)  Daily Height in cm: 149.86 (10 Oct 2018 19:46)    Daily     GENERAL:  NAD, Appears stated age  HEENT:  NC/AT,  conjunctivae clear and pink, sclera -anicteric  CHEST:  CTA B/L, Normal effort  HEART:  RRR S1/S2, No murmurs  ABDOMEN:  Soft, non-tender, non-distended, normoactive bowel sounds  EXTEREMITIES:  No cyanosis or Edema  SKIN:  Warm & Dry. No rash or erythema  NEURO: AOx0, moving all extremities    LABS:                        13.6   7.12  )-----------( 136      ( 10 Oct 2018 10:53 )             41.4       10-10    139  |  97<L>  |  6<L>  ----------------------------<  89  4.3   |  27  |  0.66    Ca    9.6      10 Oct 2018 10:53    TPro  7.7  /  Alb  4.2  /  TBili  0.3  /  DBili  x   /  AST  30  /  ALT  18  /  AlkPhos  65  10-10    LIVER FUNCTIONS - ( 10 Oct 2018 10:53 )  Alb: 4.2 g/dL / Pro: 7.7 g/dL / ALK PHOS: 65 u/L / ALT: 18 u/L / AST: 30 u/L / GGT: x           PT/INR - ( 10 Oct 2018 10:53 )   PT: 11.6 SEC;   INR: 1.04        PTT - ( 10 Oct 2018 10:53 )  PTT:33.9 SEC    Imaging:    < from: CT Abdomen and Pelvis No Cont (10.10.18 @ 16:45) >  BOWEL: Two high density linear objects measuring up to 0.5 cm in the proximal/third portion of the duodenum (series 2 image 53 and 51). Additional punctate high density material noted in the jejunum (series 2 image 57 and 62). No bowel obstruction.   PERITONEUM: No ascites or free air.  VESSELS:  Within normal limits.  RETROPERITONEUM: No lymphadenopathy.    ABDOMINAL WALL: Within normal limits.  BONES: Mild scoliotic curvature.    IMPRESSION:   High density linear objects in the proximal duodenum may represent fragments of glass. Additional punctate densities in the jejunum, possibly additional fragments.  < end of copied text >
Patient is a 23y old  Male who presents with a chief complaint of Glass ingestion (11 Oct 2018 12:21)    SUBJECTIVE / OVERNIGHT EVENTS:  Pt seen earlier this morning with kaela Fitzpatrick at bedside.  Pt was walking in [place], agitated earlier, but after allowed to eat he is much calmer.  Pt does not answer ?s but seems comfortable.  He says "hi" and "wow".  Aide reports pt at baseline.    MEDICATIONS  (STANDING):  cholecalciferol 2000 Unit(s) Oral daily  guaiFENesin    Syrup 100 milliGRAM(s) Oral once  influenza   Vaccine 0.5 milliLiter(s) IntraMuscular once  lithium CR (ESKALITH-CR) 450 milliGRAM(s) Oral at bedtime  QUEtiapine 400 milliGRAM(s) Oral two times a day  risperiDONE   Tablet 3 milliGRAM(s) Oral at bedtime    MEDICATIONS  (PRN):  LORazepam     Tablet 2 milliGRAM(s) Oral every 6 hours PRN Agitation  LORazepam   Injectable 2 milliGRAM(s) IV Push every 6 hours PRN Severe Agitation    Vital Signs Last 24 Hrs  T(C): 36.4 (11 Oct 2018 13:48), Max: 36.4 (11 Oct 2018 05:41)  T(F): 97.6 (11 Oct 2018 13:48), Max: 97.6 (11 Oct 2018 13:48)  HR: 109 (11 Oct 2018 13:48) (89 - 109)  BP: 127/84 (11 Oct 2018 13:48) (110/72 - 127/84)  RR: 18 (11 Oct 2018 13:48) (18 - 18)  SpO2: 98% (11 Oct 2018 13:48) (98% - 99%)    PHYSICAL EXAM:  GENERAL: WM with MR, sitting in WC with helmet on, aide feeding him, minimally verbal  HEAD:  Atraumatic, Normocephalic  EYES: EOMI, PERRLA, conjunctiva and sclera clear  NECK: Supple, No JVD  CHEST/LUNG: Clear to auscultation bilaterally; No wheeze  HEART: Regular rate and rhythm; No murmurs, rubs, or gallops  ABDOMEN: Soft, Nontender, Nondistended; Bowel sounds present  EXTREMITIES:  2+ Peripheral Pulses, No clubbing, cyanosis, or edema  PSYCH: calm  NEUROLOGY: non-focal  SKIN: No rashes or lesions    LABS:                        13.6   7.12  )-----------( 136      ( 10 Oct 2018 10:53 )             41.4     139  |  97<L>  |  6<L>  ----------------------------<  89  4.3   |  27  |  0.66    Ca    9.6      10 Oct 2018 10:53    TPro  7.7  /  Alb  4.2  /  TBili  0.3  /  DBili  x   /  AST  30  /  ALT  18  /  AlkPhos  65  10-10    PT/INR - ( 10 Oct 2018 10:53 )   PT: 11.6 SEC;   INR: 1.04     PTT - ( 10 Oct 2018 10:53 )  PTT:33.9 SEC    RADIOLOGY & ADDITIONAL TESTS:    Imaging Personally Reviewed:    Consultant(s) Notes Reviewed:      Care Discussed with Consultants/Other Providers: RN, SW, CM, ADS, gen surg re overall care
Patient is a 23y old  Male who presents with a chief complaint of Glass ingestion (12 Oct 2018 08:21)    SUBJECTIVE / OVERNIGHT EVENTS:  Pt seen earlier.  Aides wheeling him around unit, seems at baseline.  No distress noted.    MEDICATIONS  (STANDING):  cholecalciferol 2000 Unit(s) Oral daily  docusate sodium 100 milliGRAM(s) Oral three times a day  influenza   Vaccine 0.5 milliLiter(s) IntraMuscular once  lactulose Syrup 20 Gram(s) Oral every 6 hours  lithium CR (ESKALITH-CR) 450 milliGRAM(s) Oral at bedtime  polyethylene glycol 3350 17 Gram(s) Oral two times a day  QUEtiapine 400 milliGRAM(s) Oral two times a day  risperiDONE   Tablet 3 milliGRAM(s) Oral at bedtime  senna 2 Tablet(s) Oral at bedtime    MEDICATIONS  (PRN):  LORazepam     Tablet 2 milliGRAM(s) Oral every 6 hours PRN Agitation  LORazepam   Injectable 2 milliGRAM(s) IV Push every 6 hours PRN Severe Agitation    Vital Signs Last 24 Hrs  T(C): 36.7 (12 Oct 2018 06:02), Max: 36.7 (12 Oct 2018 06:02)  T(F): 98 (12 Oct 2018 06:02), Max: 98 (12 Oct 2018 06:02)  HR: 121 (12 Oct 2018 13:46) (88 - 121)  BP: 143/98 (12 Oct 2018 13:46) (109/60 - 143/98)  RR: 18 (12 Oct 2018 13:46) (18 - 18)  SpO2: 92% (12 Oct 2018 13:46) (92% - 99%)    PHYSICAL EXAM:  GENERAL: WM with MR, sitting in WC with helmet on, aide feeding him, minimally verbal  HEAD:  Atraumatic, Normocephalic  EYES: EOMI, PERRLA, conjunctiva and sclera clear  NECK: Supple, No JVD  CHEST/LUNG: Clear to auscultation bilaterally; No wheeze  HEART: Regular rate and rhythm; No murmurs, rubs, or gallops  ABDOMEN: Soft, Nontender, Nondistended; Bowel sounds present  EXTREMITIES:  2+ Peripheral Pulses, No clubbing, cyanosis, or edema  PSYCH: calm  NEUROLOGY: non-focal  SKIN: No rashes or lesions    LABS:                        14.0   11.07 )-----------( 153      ( 12 Oct 2018 06:50 )             42.0     139  |  100  |  10  ----------------------------<  84  4.2   |  26  |  0.67    Ca    9.0      12 Oct 2018 08:38    RADIOLOGY & ADDITIONAL TESTS:    Imaging Personally Reviewed:    Consultant(s) Notes Reviewed:  surg    Care Discussed with Consultants/Other Providers: RN, SW, CM, ADS re overall care
Patient is a 23y old  Male who presents with a chief complaint of Glass ingestion (12 Oct 2018 17:33)    SUBJECTIVE / OVERNIGHT EVENTS:  No problems reported.  Aides do not feel pt showing any signs of distress, at baseline mental status.      MEDICATIONS  (STANDING):  cholecalciferol 2000 Unit(s) Oral daily  docusate sodium 100 milliGRAM(s) Oral three times a day  influenza   Vaccine 0.5 milliLiter(s) IntraMuscular once  lithium CR (ESKALITH-CR) 450 milliGRAM(s) Oral at bedtime  polyethylene glycol 3350 17 Gram(s) Oral two times a day  QUEtiapine 400 milliGRAM(s) Oral two times a day  risperiDONE   Tablet 3 milliGRAM(s) Oral at bedtime  senna 2 Tablet(s) Oral at bedtime    MEDICATIONS  (PRN):  guaiFENesin    Syrup 100 milliGRAM(s) Oral every 6 hours PRN Cough  LORazepam     Tablet 2 milliGRAM(s) Oral every 6 hours PRN Agitation  LORazepam   Injectable 2 milliGRAM(s) IV Push every 6 hours PRN Severe Agitation    Vital Signs Last 24 Hrs  T(C): 36.2 (13 Oct 2018 12:20), Max: 36.4 (12 Oct 2018 23:36)  T(F): 97.1 (13 Oct 2018 12:20), Max: 97.5 (12 Oct 2018 23:36)  HR: 115 (13 Oct 2018 12:20) (101 - 115)  BP: 123/79 (13 Oct 2018 12:20) (100/55 - 123/79)  RR: 18 (13 Oct 2018 12:20) (18 - 18)  SpO2: 99% (13 Oct 2018 12:20) (96% - 99%)    PHYSICAL EXAM:  GENERAL: WM with MR, sitting in WC with helmet on, minimally verbal  HEAD:  Atraumatic, Normocephalic  EYES: EOMI, PERRLA, conjunctiva and sclera clear  NECK: Supple, No JVD  CHEST/LUNG: Clear to auscultation bilaterally; No wheeze  HEART: Regular rate and rhythm; No murmurs, rubs, or gallops  ABDOMEN: Soft, Nontender, Nondistended; Bowel sounds present  EXTREMITIES:  2+ Peripheral Pulses, No clubbing, cyanosis, or edema  PSYCH: calm  NEUROLOGY: non-focal    LABS:             12.6   5.68  )-----------( 160      ( 13 Oct 2018 05:30 )             38.4     140  |  100  |  10  ----------------------------<  92  4.2   |  28  |  0.68    Ca    8.7      13 Oct 2018 05:30    RADIOLOGY & ADDITIONAL TESTS:    Imaging Personally Reviewed:    Consultant(s) Notes Reviewed:      Care Discussed with Consultants/Other Providers: RN, SW, CM, ADS re overall care
Patient is a 23y old  Male who presents with a chief complaint of Glass ingestion (15 Oct 2018 06:31)    SUBJECTIVE / OVERNIGHT EVENTS:  Did not sleep over night, sleeping soundly. Pt seen earlier, kaela Schroeder at bedside.  No signs distress.    MEDICATIONS  (STANDING):  cholecalciferol 2000 Unit(s) Oral daily  diVALproex  milliGRAM(s) Oral three times a day  docusate sodium 100 milliGRAM(s) Oral three times a day  influenza   Vaccine 0.5 milliLiter(s) IntraMuscular once  lactulose Syrup 20 Gram(s) Oral every 6 hours  lithium CR (ESKALITH-CR) 450 milliGRAM(s) Oral at bedtime  polyethylene glycol 3350 17 Gram(s) Oral two times a day  QUEtiapine 400 milliGRAM(s) Oral two times a day  risperiDONE   Tablet 3 milliGRAM(s) Oral at bedtime  senna 2 Tablet(s) Oral at bedtime    MEDICATIONS  (PRN):  guaiFENesin    Syrup 100 milliGRAM(s) Oral every 6 hours PRN Cough  LORazepam     Tablet 2 milliGRAM(s) Oral every 6 hours PRN Agitation  LORazepam   Injectable 2 milliGRAM(s) IntraMuscular every 6 hours PRN Severe Agitation    Vital Signs Last 24 Hrs  T(C): 36.3 (15 Oct 2018 05:37), Max: 36.3 (15 Oct 2018 05:37)  T(F): 97.4 (15 Oct 2018 05:37), Max: 97.4 (15 Oct 2018 05:37)  HR: 115 (15 Oct 2018 05:37) (115 - 120)  BP: 122/84 (15 Oct 2018 05:37) (122/84 - 134/85)  RR: 18 (15 Oct 2018 05:37) (18 - 18)  SpO2: 98% (15 Oct 2018 05:37) (98% - 98%)    PHYSICAL EXAM:  GENERAL: WM with MR, sitting in WC with helmet on, minimally verbal  HEAD:  Atraumatic, Normocephalic  EYES: EOMI, PERRLA, conjunctiva and sclera clear  NECK: Supple, No JVD  CHEST/LUNG: Clear to auscultation bilaterally; No wheeze  HEART: Regular rate and rhythm; No murmurs, rubs, or gallops  ABDOMEN: bloated, soft, NT  EXTREMITIES:  2+ Peripheral Pulses, No clubbing, cyanosis, or edema  PSYCH: calm  NEUROLOGY: non-focal    LABS:             13.6   8.34  )-----------( 197      ( 15 Oct 2018 05:20 )             41.1     138  |  96<L>  |  13  ----------------------------<  102<H>  3.7   |  26  |  0.66    Ca    9.7      15 Oct 2018 05:20    RADIOLOGY & ADDITIONAL TESTS:    Imaging Personally Reviewed:    Consultant(s) Notes Reviewed:      Care Discussed with Consultants/Other Providers: RN, SW, CM, ADS, surgery re overall care
Patient is a 23y old  Male who presents with a chief complaint of Glass ingestion (15 Oct 2018 16:47)    SUBJECTIVE / OVERNIGHT EVENTS:  +BMs yesterday.  Did not sleep last night, sleeping this morning.  No distress noted.    MEDICATIONS  (STANDING):  cholecalciferol 2000 Unit(s) Oral daily  diVALproex  milliGRAM(s) Oral three times a day  docusate sodium 100 milliGRAM(s) Oral three times a day  influenza   Vaccine 0.5 milliLiter(s) IntraMuscular once  lactulose Syrup 20 Gram(s) Oral every 6 hours  lithium CR (ESKALITH-CR) 450 milliGRAM(s) Oral at bedtime  polyethylene glycol 3350 17 Gram(s) Oral two times a day  QUEtiapine 400 milliGRAM(s) Oral two times a day  risperiDONE   Tablet 3 milliGRAM(s) Oral at bedtime  senna 2 Tablet(s) Oral at bedtime    MEDICATIONS  (PRN):  guaiFENesin    Syrup 100 milliGRAM(s) Oral every 6 hours PRN Cough  LORazepam     Tablet 2 milliGRAM(s) Oral every 6 hours PRN Agitation  LORazepam   Injectable 2 milliGRAM(s) IntraMuscular every 6 hours PRN Severe Agitation    Vital Signs Last 24 Hrs  T(C): 36.4 (16 Oct 2018 06:29), Max: 36.4 (15 Oct 2018 13:00)  T(F): 97.6 (16 Oct 2018 06:29), Max: 97.6 (15 Oct 2018 13:00)  HR: 105 (16 Oct 2018 06:29) (100 - 116)  BP: 124/74 (16 Oct 2018 06:29) (121/79 - 128/82)  RR: 18 (16 Oct 2018 06:29) (17 - 18)  SpO2: 99% (16 Oct 2018 06:29) (97% - 99%)    PHYSICAL EXAM:  GENERAL: WM with MR, sitting in WC with helmet on, minimally verbal  HEAD:  Atraumatic, Normocephalic  EYES: EOMI, PERRLA, conjunctiva and sclera clear  NECK: Supple, No JVD  CHEST/LUNG: Clear to auscultation bilaterally; No wheeze  HEART: Regular rate and rhythm; No murmurs, rubs, or gallops  ABDOMEN: +BS, soft, NT  EXTREMITIES:  2+ Peripheral Pulses, No clubbing, cyanosis, or edema  PSYCH: calm  NEUROLOGY: non-focal    LABS:             12.2   6.37  )-----------( 177      ( 16 Oct 2018 06:55 )             36.9     140  |  99  |  13  ----------------------------<  104<H>  4.5   |  26  |  0.71    Ca    9.6      15 Oct 2018 19:05    RADIOLOGY & ADDITIONAL TESTS:    Imaging Personally Reviewed:    Consultant(s) Notes Reviewed:      Care Discussed with Consultants/Other Providers: RN, SW, CM, ADS re overall care
[facility] Team B Daily Progress Note    SUBJECTIVE: Patient seen and examined during morning rounds, no over night event, no acute complaint. Patient sleeping, per overnight nursing staff did not have any issues.     OBJECTIVE:   Vital Signs Last 24 Hrs  T(C): 36.7 (12 Oct 2018 06:02), Max: 36.7 (12 Oct 2018 06:02)  T(F): 98 (12 Oct 2018 06:02), Max: 98 (12 Oct 2018 06:02)  HR: 88 (12 Oct 2018 06:02) (88 - 109)  BP: 109/60 (12 Oct 2018 06:02) (109/60 - 127/84)  BP(mean): --  RR: 18 (12 Oct 2018 06:02) (18 - 18)  SpO2: 98% (12 Oct 2018 06:02) (98% - 99%)    PHYSICAL EXAM:  EXAM  General: NAD, sleeping  Abdomen: soft nt nt     RADIOLOGY & ADDITIONAL STUDIES: no new studies performed
[facility] Team B Daily Progress Note    SUBJECTIVE: Patient seen and examined during morning rounds. Per overnigtht nursing staff, patient did not sleep last night. Patient had 1x large BM.     OBJECTIVE:   Vital Signs Last 24 Hrs  T(C): 36.3 (15 Oct 2018 05:37), Max: 36.4 (14 Oct 2018 12:24)  T(F): 97.4 (15 Oct 2018 05:37), Max: 97.6 (14 Oct 2018 12:24)  HR: 115 (15 Oct 2018 05:37) (104 - 120)  BP: 122/84 (15 Oct 2018 05:37) (104/66 - 134/85)  BP(mean): --  RR: 18 (15 Oct 2018 05:37) (18 - 18)  SpO2: 98% (15 Oct 2018 05:37) (98% - 99%)    PHYSICAL EXAM:  EXAM  General: in acute distress, flailing   Abdomen: soft nt nt     RADIOLOGY & ADDITIONAL STUDIES: no new studies performed
Patient is a 23y old  Male who presents with a chief complaint of Glass ingestion (14 Oct 2018 16:39)    SUBJECTIVE / OVERNIGHT EVENTS:  Didn't sleep wall last night, was given PRN x2.  Sleeping this morning, woke up later, in no clear distress, seems at baseline per aide.  Hard BM after SMOG enema.    MEDICATIONS  (STANDING):  cholecalciferol 2000 Unit(s) Oral daily  diVALproex  milliGRAM(s) Oral three times a day  docusate sodium 100 milliGRAM(s) Oral three times a day  influenza   Vaccine 0.5 milliLiter(s) IntraMuscular once  lactulose Syrup 20 Gram(s) Oral every 6 hours  lithium CR (ESKALITH-CR) 450 milliGRAM(s) Oral at bedtime  polyethylene glycol 3350 17 Gram(s) Oral two times a day  QUEtiapine 400 milliGRAM(s) Oral two times a day  risperiDONE   Tablet 3 milliGRAM(s) Oral at bedtime  senna 2 Tablet(s) Oral at bedtime    MEDICATIONS  (PRN):  guaiFENesin    Syrup 100 milliGRAM(s) Oral every 6 hours PRN Cough  LORazepam     Tablet 2 milliGRAM(s) Oral every 6 hours PRN Agitation  LORazepam   Injectable 2 milliGRAM(s) IntraMuscular every 6 hours PRN Severe Agitation    Vital Signs Last 24 Hrs  T(C): 36.4 (14 Oct 2018 12:24), Max: 36.6 (13 Oct 2018 21:18)  T(F): 97.6 (14 Oct 2018 12:24), Max: 97.8 (13 Oct 2018 21:18)  HR: 104 (14 Oct 2018 12:24) (104 - 115)  BP: 104/66 (14 Oct 2018 12:24) (104/66 - 146/70)  RR: 18 (14 Oct 2018 12:24) (18 - 18)  SpO2: 99% (14 Oct 2018 12:24) (97% - 99%)    PHYSICAL EXAM:  GENERAL: WM with MR, sitting in WC with helmet on, minimally verbal  HEAD:  Atraumatic, Normocephalic  EYES: EOMI, PERRLA, conjunctiva and sclera clear  NECK: Supple, No JVD  CHEST/LUNG: Clear to auscultation bilaterally; No wheeze  HEART: Regular rate and rhythm; No murmurs, rubs, or gallops  ABDOMEN: Soft, Nontender, Nondistended; Bowel sounds present  EXTREMITIES:  2+ Peripheral Pulses, No clubbing, cyanosis, or edema  PSYCH: calm  NEUROLOGY: non-focal    LABS:             12.6   5.68  )-----------( 160      ( 13 Oct 2018 05:30 )             38.4     140  |  100  |  10  ----------------------------<  92  4.2   |  28  |  0.68    Ca    8.7      13 Oct 2018 05:30    RADIOLOGY & ADDITIONAL TESTS:    Imaging Personally Reviewed:    Consultant(s) Notes Reviewed:      Care Discussed with Consultants/Other Providers: RN, SW, CM, ADS re overall care

## 2018-10-16 NOTE — PROGRESS NOTE ADULT - ATTENDING COMMENTS
I have seen and evaluated the patient with the GI Fellow and GI Team.  I agree with the findings, formulation and plan of care as documented in the resident / fellows note and edited where appropriate.
team d/w staff at [facility].
team d/w staff at [facility].
team d/w staff at [facility].
team d/w staff at [facility].
team d/w staff at [facility].
team d/w staff from [facility] at bedside.  Spent 35 minutes counseling and coordinating discharge care.

## 2018-10-16 NOTE — PROGRESS NOTE ADULT - PROBLEM SELECTOR PROBLEM 3
MR (mental retardation)

## 2018-10-16 NOTE — PROGRESS NOTE ADULT - PROVIDER SPECIALTY LIST ADULT
Gastroenterology
Hospitalist
Surgery
Surgery
Hospitalist

## 2018-10-16 NOTE — PROGRESS NOTE ADULT - PROBLEM SELECTOR PROBLEM 1
Swallowed foreign body, initial encounter

## 2018-10-16 NOTE — PROGRESS NOTE ADULT - PROBLEM SELECTOR PLAN 2
appreciate psych input - continue home lithium, risperidone, and Seroquel; Ativan PRN  - on chart review appears normally on depakote, resume
appreciate psych input - continue home lithium,  risperidone, and Seroquel; Ativan PRN
appreciate psych input - continue home lithium, risperidone, and Seroquel; Ativan PRN  - on chart review appears normally on depakote - resumed
appreciate psych input - continue home lithium, risperidone, and Seroquel; Ativan PRN  - on chart review appears normally on depakote - resumed

## 2018-10-16 NOTE — PROGRESS NOTE ADULT - PROBLEM SELECTOR PLAN 3
aides with pt, supportive care  avoid BZD/Ambien/anticholinergics, day/night cues, supportive care

## 2019-08-20 NOTE — PROGRESS NOTE ADULT - ASSESSMENT
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 If the transfusion requirement goes above 1 unit a month, we should start Procrit. Eventually, the disease may transform into acute leukemia in which case Vidaza/decitabine will be options.\par They were told about the natural history of MDS that eventually it may lead to acute leukemia.\par He will require close monitoring. \par Depending on the above blood work results, further recommendations, such as the frequency of the follow ups will be made.\par \par All their questions answered.\par \par Further recommendations after the above results are available.\par  Impression:  # Glass ingestion: No evidence of peritonitis on exam. Subcentimeter (0.5) linear objects seen in small bowel on CT yesterday (likely ingested glass). Likely not a retrievable endoscopically given location, size and likely intermixed with stool.     Recommendation:  - No plans of endoscopic procedure  - Supportive care per primary team    Nila Schwartz MD  Gastroenterology Fellow  [phone]  Please page on call fellow on weekends and after 5pm on weekdays

## 2023-11-06 NOTE — ED ADULT NURSE NOTE - PRIMARY CARE PROVIDER
unknown Infliximab Counseling:  I discussed with the patient the risks of infliximab including but not limited to myelosuppression, immunosuppression, autoimmune hepatitis, demyelinating diseases, lymphoma, and serious infections.  The patient understands that monitoring is required including a PPD at baseline and must alert us or the primary physician if symptoms of infection or other concerning signs are noted.

## 2024-04-03 NOTE — ED ADULT NURSE NOTE - NS ED PATIENT SAFETY CONCERN
[Dear  ___] : Dear  [unfilled], [Courtesy Letter:] : I had the pleasure of seeing your patient, [unfilled], in my office today. [Please see my note below.] : Please see my note below. [Consult Closing:] : Thank you very much for allowing me to participate in the care of this patient.  If you have any questions, please do not hesitate to contact me. [Sincerely,] : Sincerely, [FreeTextEntry3] : Viraj Sanchez M.D., Ph.D. DPN-N [facility] [place] Director, Division of Neurology Director, [facility]

## 2024-12-16 NOTE — BEHAVIORAL HEALTH ASSESSMENT NOTE - NSBHHPIREASON_PSY_A_CORE
"This provider is located at [facility], Behavioral Health ,[address], [address]"" in [place], [place],using a secure MyChart Video Visit through [employer]. Patient is being seen remotely via telehealth at their home address in [place], and stated they are in a secure environment for this session. The patient's condition being diagnosed/treated is appropriate for telemedicine. The provider identified herself as well as her credentials.   The patient, and/or patients guardian, consent to be seen remotely, and when consent is given they understand that the consent allows for patient identifiable information to be sent to a third party as needed.   They may refuse to be seen remotely at any time. The electronic data is encrypted and password protected, and the patient and/or guardian has been advised of the potential risks to privacy not withstanding such measures.    Mode of Visit: Video   Location of patient: -WORK-   Location of provider: [facility]   You have chosen to receive care through a telehealth visit.   The patient has signed the video visit consent form.   The visit included audio and video interaction. No technical issues occurred during this visit.     Chief Complaint  Bipolar I disorder and anxiety      Subjective          Royer Jensen presents via [employer] video through Epic by himself for a follow up and medication check.    History of Present Illness: Royer states, \"I am good.\" Royer tells me that he has tolerated the change from Olanzapine to Latuda well. He likes that it is not so sedating, but that he still sleeps well. There was some nausea when starting, but this is improved. He denies major depression or signs of venita, but there still is irritability at times. He denies any medical changes or problems with appetite once nausea improved.  He is taking Lithium, Propranolol, and Latuda.  His last lithium level was 0.31 mmol/L on 09/04/24.  He denies any side effects. " He denies any SI/HI/AVH.     Current Medications:   Current Outpatient Medications   Medication Sig Dispense Refill    Lurasidone HCl (LATUDA) 40 MG tablet tablet Take 1 tablet by mouth Daily With Dinner. 30 tablet 2    propranolol (INDERAL) 10 MG tablet Take 1 tablet by mouth 2 (Two) Times a Day As Needed (anxiety). for anxiety 180 tablet 1    lithium 300 MG tablet Take 1 tablet by mouth 2 (Two) Times a Day. 60 tablet 0     No current facility-administered medications for this visit.         Objective   Vital Signs:   There were no vitals taken for this visit.    Physical Exam  Nursing note reviewed. Vitals reviewed: no vital due to nature of telehealth visit.  Constitutional:       Appearance: Normal appearance. He is well-developed and normal weight.   Neurological:      General: No focal deficit present.      Mental Status: He is alert and oriented to person, place, and time.   Psychiatric:         Attention and Perception: Attention normal.         Mood and Affect: Mood and affect normal.         Speech: Speech normal.         Behavior: Behavior normal. Behavior is cooperative.         Thought Content: Thought content normal.         Cognition and Memory: Cognition normal.         Judgment: Judgment normal.        Result Review :   The following data was reviewed by DEVONTE Reynolds on 12/19/2024:     BEHAVIORAL HEALTH - [facility] LAB RESULTS (10/10/2024)        Assessment and Plan    Problem List Items Addressed This Visit    None  Visit Diagnoses       Bipolar I disorder, current or most recent episode depressed, in partial remission  (Chronic)   -  Primary    Relevant Medications    lithium 300 MG tablet    Lurasidone HCl (LATUDA) 40 MG tablet tablet    Anxious mood  (Chronic)                   Mental Status Exam:   Hygiene:   good  Cooperation:  Cooperative  Eye Contact:  Good  Psychomotor Behavior:  Appropriate  Affect:  Appropriate  Mood: normal  Speech:  Normal  Thought Process:   Goal directed and Linear  Thought Content:  Normal  Suicidal:  None  Homicidal:  None  Hallucinations:  None  Delusion:  None  Memory:  Intact  Orientation:  Person, Place, Time and Situation  Reliability:  good  Insight:  Good  Judgement:  Good  Impulse Control:  Good  Physical/Medical Issues:  No      PHQ-9 Score:   PHQ-9 Total Score:      Impression/Plan:  -This is a follow up and medication check.  Royer reports positive changes when going from Olanzapine to Latuda. He remains motivated to reduce medication burden and attempt to come off Lithium if possible. He believes an increase in Latuda would help support his mood while coming off Lithium. I described the taper process, but also encouraged him to monitor for warning signs of worsening of mood or venita. I encouraged him that we can slow down the taper or stay on a low dose if needed. He agrees to try. He was reminded to take Latuda with food. We explored cost of Lurasidone through [facility] and [facility]   -Taper to stop lithium. Change to  mg twice daily for a week then decrease to 300 mg nightly for a week then 150 mg nightly for a week then 150 mg every other night for a week then stop.   -Increase Latuda to 40 mg nightly with food for bipolar I disorder.   -Continue propranolol 10 mg twice daily as needed for anxiety.  Patient has refills.  -Last Lithium level 09/04/24. 0.31 mmol/L  -I will conduct an AIMS assessment at next visit.  No abnormal movements were noted.    MEDS ORDERED DURING VISIT:  New Medications Ordered This Visit   Medications    lithium 300 MG tablet     Sig: Take 1 tablet by mouth 2 (Two) Times a Day.     Dispense:  60 tablet     Refill:  0    Lurasidone HCl (LATUDA) 40 MG tablet tablet     Sig: Take 1 tablet by mouth Daily With Dinner.     Dispense:  30 tablet     Refill:  2         Follow Up   Return in about 2 months (around 2/19/2025) for Medication Check.  Patient was given instructions and  counseling regarding his condition or for health maintenance advice. Please see specific information pulled into the AVS if appropriate.       TREATMENT PLAN/GOALS: Continue supportive psychotherapy efforts and medications as indicated. Treatment and medication options discussed during today's visit. Patient acknowledged and verbally consented to continue with current treatment plan and was educated on the importance of compliance with treatment and follow-up appointments.    MEDICATION ISSUES:  Discussed medication options and treatment plan of prescribed medication as well as the risks, benefits, and side effects including potential falls, possible impaired driving and metabolic adversities among others. Patient is agreeable to call the office with any worsening of symptoms or onset of side effects. Patient is agreeable to call 911 or go to the nearest ER should he/she begin having SI/HI.        This document has been electronically signed by DEVONTE Martin, PMHNP-BC  December 19, 2024 21:18 EST    Part of this note may be an electronic transcription/translation of spoken language to printed text using the Dragon Dictation System.   Inpatient Psychiatry admission
